# Patient Record
Sex: FEMALE | Race: WHITE | NOT HISPANIC OR LATINO | URBAN - METROPOLITAN AREA
[De-identification: names, ages, dates, MRNs, and addresses within clinical notes are randomized per-mention and may not be internally consistent; named-entity substitution may affect disease eponyms.]

---

## 2023-09-14 PROBLEM — R07.9 CHEST PAIN AT REST: Status: ACTIVE | Noted: 2019-12-02

## 2023-09-14 PROBLEM — H93.13 TINNITUS OF BOTH EARS: Status: ACTIVE | Noted: 2023-09-14

## 2023-09-14 PROBLEM — R32 INCONTINENCE OF URINE IN FEMALE: Status: ACTIVE | Noted: 2022-07-18

## 2023-09-14 PROBLEM — L03.113 CELLULITIS OF RIGHT ARM: Status: ACTIVE | Noted: 2020-06-04

## 2023-09-14 PROBLEM — F41.9 ANXIETY: Status: ACTIVE | Noted: 2022-12-28

## 2023-09-14 PROBLEM — R79.0 LOW MAGNESIUM LEVELS: Status: ACTIVE | Noted: 2022-07-18

## 2023-09-14 PROBLEM — R73.09 ELEVATED GLUCOSE LEVEL: Status: ACTIVE | Noted: 2022-07-28

## 2023-09-14 PROBLEM — R42 VERTIGO: Status: ACTIVE | Noted: 2023-09-14

## 2023-09-14 PROBLEM — M79.10 MYALGIA: Status: ACTIVE | Noted: 2022-05-31

## 2023-09-14 PROBLEM — I48.91 A-FIB (MULTI): Status: ACTIVE | Noted: 2021-05-06

## 2023-09-14 PROBLEM — H90.3 BILATERAL SENSORINEURAL HEARING LOSS: Status: ACTIVE | Noted: 2023-09-14

## 2023-09-14 PROBLEM — E78.5 HYPERLIPEMIA: Status: ACTIVE | Noted: 2021-05-06

## 2023-09-14 PROBLEM — E53.8 VITAMIN B 12 DEFICIENCY: Status: ACTIVE | Noted: 2022-07-18

## 2023-09-14 PROBLEM — K13.0 ANGULAR CHEILITIS: Status: ACTIVE | Noted: 2022-12-28

## 2023-09-14 PROBLEM — E55.9 VITAMIN D DEFICIENCY: Status: ACTIVE | Noted: 2022-07-18

## 2023-09-14 RX ORDER — DIPHENHYDRAMINE HCL 25 MG
1 CAPSULE ORAL NIGHTLY PRN
COMMUNITY
Start: 2020-06-26

## 2023-09-14 RX ORDER — ROSUVASTATIN CALCIUM 10 MG/1
1 TABLET, COATED ORAL NIGHTLY
COMMUNITY
Start: 2023-05-17 | End: 2024-02-21 | Stop reason: SDUPTHER

## 2023-09-14 RX ORDER — CLOTRIMAZOLE AND BETAMETHASONE DIPROPIONATE 10; .64 MG/G; MG/G
CREAM TOPICAL
COMMUNITY
Start: 2022-12-28 | End: 2023-12-01 | Stop reason: ALTCHOICE

## 2023-09-14 RX ORDER — NAPROXEN SODIUM 220 MG/1
1 TABLET ORAL DAILY
COMMUNITY
Start: 2020-05-11

## 2023-09-14 RX ORDER — ESCITALOPRAM OXALATE 10 MG/1
1 TABLET ORAL DAILY
COMMUNITY
Start: 2021-07-19 | End: 2023-12-01 | Stop reason: ALTCHOICE

## 2023-09-14 RX ORDER — VIT C/E/ZN/COPPR/LUTEIN/ZEAXAN 250MG-90MG
1 CAPSULE ORAL DAILY
COMMUNITY
Start: 2023-05-17

## 2023-09-14 RX ORDER — ESTRADIOL 0.1 MG/G
1 CREAM VAGINAL
COMMUNITY
Start: 2022-07-18 | End: 2023-12-01 | Stop reason: ALTCHOICE

## 2023-09-14 RX ORDER — CIPROFLOXACIN HYDROCHLORIDE 3 MG/ML
2 SOLUTION/ DROPS OPHTHALMIC EVERY 4 HOURS
COMMUNITY
End: 2023-12-01 | Stop reason: ALTCHOICE

## 2023-10-24 ENCOUNTER — CLINICAL SUPPORT (OUTPATIENT)
Dept: AUDIOLOGY | Facility: CLINIC | Age: 68
End: 2023-10-24
Payer: MEDICARE

## 2023-10-24 NOTE — PROGRESS NOTES
DEVICES: (2) [employer] P90-R RICs with medium open domes; medium receivers;  length: 1; color: P5    RIGHT SN: [id number]  LEFT SN: [id number]  TRIAL PERIOD: 5/26/2021  REPAIR/L&D WARRANTIES: 7/4/2024  FIT DATE: 4/26/2021    Reason For Visit: Hanh Mclean was seen today because her hearing aids stopped charging properly.     - [employer] charging update was saved onto hearing aid.  - Patient was given updated charging plug.  - Recommended that patient charge hearing aids using wall plug instead on extension chord.    If charging difficulties continue Ms. Mclean will contact [facility] and will have her hearing aids sent to [employer] for in warranty repair.

## 2023-12-01 ENCOUNTER — TELEMEDICINE (OUTPATIENT)
Dept: PRIMARY CARE | Facility: CLINIC | Age: 68
End: 2023-12-01
Payer: MEDICARE

## 2023-12-01 DIAGNOSIS — K59.09 CHRONIC CONSTIPATION: ICD-10-CM

## 2023-12-01 DIAGNOSIS — F41.9 ANXIETY: Primary | ICD-10-CM

## 2023-12-01 PROCEDURE — 99214 OFFICE O/P EST MOD 30 MIN: CPT | Performed by: FAMILY MEDICINE

## 2023-12-01 RX ORDER — HYDROXYZINE HYDROCHLORIDE 10 MG/1
20 TABLET, FILM COATED ORAL EVERY 24 HOURS
Qty: 180 TABLET | Refills: 1 | Status: SHIPPED | OUTPATIENT
Start: 2023-12-01 | End: 2024-02-21 | Stop reason: SDUPTHER

## 2023-12-01 RX ORDER — SENNOSIDES 8.6 MG/1
1 TABLET ORAL DAILY
COMMUNITY
End: 2024-06-04 | Stop reason: ALTCHOICE

## 2023-12-01 RX ORDER — SERTRALINE HYDROCHLORIDE 50 MG/1
50 TABLET, FILM COATED ORAL EVERY 24 HOURS
COMMUNITY
Start: 2023-08-30 | End: 2023-12-01 | Stop reason: SDUPTHER

## 2023-12-01 RX ORDER — SERTRALINE HYDROCHLORIDE 50 MG/1
50 TABLET, FILM COATED ORAL EVERY 24 HOURS
Qty: 90 TABLET | Refills: 1 | Status: SHIPPED | OUTPATIENT
Start: 2023-12-01 | End: 2024-02-21 | Stop reason: SDUPTHER

## 2023-12-01 RX ORDER — HYDROXYZINE HYDROCHLORIDE 10 MG/1
10 TABLET, FILM COATED ORAL EVERY 24 HOURS
COMMUNITY
Start: 2023-09-27 | End: 2023-12-01 | Stop reason: SDUPTHER

## 2023-12-01 ASSESSMENT — ENCOUNTER SYMPTOMS
LOSS OF SENSATION IN FEET: 0
DEPRESSION: 0
OCCASIONAL FEELINGS OF UNSTEADINESS: 0

## 2023-12-01 ASSESSMENT — LIFESTYLE VARIABLES
HOW OFTEN DO YOU HAVE A DRINK CONTAINING ALCOHOL: 4 OR MORE TIMES A WEEK
AUDIT-C TOTAL SCORE: 5
HOW OFTEN DO YOU HAVE SIX OR MORE DRINKS ON ONE OCCASION: LESS THAN MONTHLY
HOW MANY STANDARD DRINKS CONTAINING ALCOHOL DO YOU HAVE ON A TYPICAL DAY: 1 OR 2
SKIP TO QUESTIONS 9-10: 0

## 2023-12-01 ASSESSMENT — PATIENT HEALTH QUESTIONNAIRE - PHQ9
1. LITTLE INTEREST OR PLEASURE IN DOING THINGS: NOT AT ALL
SUM OF ALL RESPONSES TO PHQ9 QUESTIONS 1 AND 2: 0
2. FEELING DOWN, DEPRESSED OR HOPELESS: NOT AT ALL

## 2023-12-01 ASSESSMENT — PAIN SCALES - GENERAL: PAINLEVEL: 0-NO PAIN

## 2023-12-01 NOTE — PATIENT INSTRUCTIONS
Problem List Items Addressed This Visit             ICD-10-CM    Anxiety - Primary F41.9       Additional Visit Plans:  Referral for counseling at [facility] phone [phone]     Look into a Squatty Potty. Add metamucil capsules daily. Fiber with diet and supplement with goal of 25g daily.     Can look into MagOx 400mg nightly as needed.     Continue 50mg Zoloft, can increase in future if we need this to help our bowels more as a secondary measure. Continue hydroxyzine.     Follow up in 6 months or sooner if needed.     Next Wellness Exam/Annual Physical Due  At your earliest convenience    Patient Care Team:  Olaf English DO as PCP - General (Family Medicine)    Olaf English DO   12/01/23   12:52 PM

## 2023-12-01 NOTE — PROGRESS NOTES
Outpatient Visit Note    Chief Complaint   Patient presents with    Med Refill       With patient's permission, this is a Telemedicine visit with video and audio. The provider and patient participated in this telemedicine encounter.    HPI:  Hanh Mclean is a 68 y.o. female here for follow-up on her medication.    In September we discussed her anxiety for which transition to Zoloft was producing better coverage.  Lexapro was disrupting her sleep.  Her mood was still getting a little low at 5 PM they will and she was still having trouble with her sleep, laying in bed for several hours.  Plan was to trial hydroxyzine as needed for her difficulty with her sleep    Previously on Celexa after being on that for many years and was no longer covering her.  Her son's ADHD with insomnia and she herself was wondering if there was any component of this for her.     Today she states that she would like a referral to counseling.     She remains on 50mg Zoloft once daily, and says this is working well. The hydroxyzine is good too - she takes 2 at night and this really helps.    She added senna on her own for her constipation. This is a chronic struggle. She does this once every 10 days. She does water, pears, exercise.       Past Medical History:   Diagnosis Date    Migraine, unspecified, not intractable, without status migrainosus     Migraines    Personal history of other mental and behavioral disorders     History of anxiety        Current Medications  Current Outpatient Medications   Medication Instructions    cholecalciferol (Vitamin D-3) 25 MCG (1000 UT) capsule 1 capsule, oral, Daily    diphenhydrAMINE (BenadryL) 25 mg capsule 1 capsule, oral, Nightly PRN    hydrOXYzine HCL (ATARAX) 10 mg, oral, Every 24 hours    MAGNESIUM GLUCONATE ORAL 1 tablet, oral, Daily, ; magnesium gluconate 250 mg oral tablet<BR>    NON FORMULARY 1 tablet, Daily, methofolate 1000<BR>    omega 3-dha-epa-fish oil (Fish OiL) 1,200 (144-216)  mg capsule 1 capsule, oral, Daily    rivaroxaban (Xarelto) 20 mg tablet 1 tablet, oral, Daily, With food     rosuvastatin (Crestor) 10 mg tablet 1 tablet, oral, Nightly    sennosides (senna) 8.6 mg tablet 1 tablet, oral, Daily    sertraline (ZOLOFT) 50 mg, oral, Every 24 hours    vitamin B complex (B-COMPLEX ORAL) oral, Daily, ;B-Complex SR<BR>    vits A,C,E/lutein/minerals (OCUVITE WITH LUTEIN ORAL) oral, Daily        Allergies  No Known Allergies     Past Surgical History:   Procedure Laterality Date    OTHER SURGICAL HISTORY  2021    Knee surgery    OTHER SURGICAL HISTORY  2021    Lumpectomy    OTHER SURGICAL HISTORY  2021     section     Family History   Problem Relation Name Age of Onset    Obesity Sister          Have hearing problems    Obesity Sister          Have hearing problems    No Known Problems Daughter      No Known Problems Son       Social History     Tobacco Use    Smoking status: Former     Types: Cigarettes    Smokeless tobacco: Never   Vaping Use    Vaping Use: Never used   Substance Use Topics    Alcohol use: Yes     Alcohol/week: 10.0 standard drinks of alcohol     Types: 10 Glasses of wine per week    Drug use: Never     Tobacco Use: Medium Risk (2023)    Patient History     Smoking Tobacco Use: Former     Smokeless Tobacco Use: Never     Passive Exposure: Not on file        ROS  All pertinent positive symptoms are included in the history of present illness.  All other systems have been reviewed and are negative and noncontributory to this patient's current ailments.    VITAL SIGNS  There were no vitals filed for this visit.  There were no vitals filed for this visit.   There is no height or weight on file to calculate BMI.   Patient is unable to proivide    PHYSICAL EXAM  GENERAL APPEARANCE:  Alert and oriented x 3, Pleasant and cooperative, No acute distress.   LUNGS:  No conversational dyspnea or cough during encounter.   PSYCH:  appropriate mood and affect,  no difficulty with speech.   Telemedicine visit, no other exam component done.      Assessment/Plan   Problem List Items Addressed This Visit             ICD-10-CM    Anxiety - Primary F41.9       Additional Visit Plans:  Referral for counseling at [facility] phone [phone]     Look into a Squatty Potty. Add metamucil capsules daily. Fiber with diet and supplement with goal of 25g daily.     Can look into MagOx 400mg nightly as needed.     Continue 50mg Zoloft, can increase in future if we need this to help our bowels more as a secondary measure. Continue hydroxyzine.     Follow up in 6 months or sooner if needed.     Next Wellness Exam/Annual Physical Due  At your earliest convenience    Patient Care Team:  Olaf English DO as PCP - General (Family Medicine)    Olaf English DO   12/01/23   12:52 PM

## 2024-01-26 ENCOUNTER — TELEMEDICINE (OUTPATIENT)
Dept: PRIMARY CARE | Facility: CLINIC | Age: 69
End: 2024-01-26
Payer: MEDICARE

## 2024-01-26 DIAGNOSIS — R42 DIZZINESS: ICD-10-CM

## 2024-01-26 DIAGNOSIS — G44.1 VASCULAR HEADACHE, NOT ELSEWHERE CLASSIFIED: ICD-10-CM

## 2024-01-26 DIAGNOSIS — E78.2 MIXED HYPERLIPIDEMIA: Primary | ICD-10-CM

## 2024-01-26 DIAGNOSIS — E55.9 VITAMIN D DEFICIENCY: ICD-10-CM

## 2024-01-26 PROCEDURE — 99214 OFFICE O/P EST MOD 30 MIN: CPT | Performed by: FAMILY MEDICINE

## 2024-01-26 RX ORDER — LANOLIN ALCOHOL/MO/W.PET/CERES
400 CREAM (GRAM) TOPICAL DAILY
COMMUNITY

## 2024-01-26 RX ORDER — AMITRIPTYLINE HYDROCHLORIDE 25 MG/1
25 TABLET, FILM COATED ORAL NIGHTLY
Qty: 30 TABLET | Refills: 1 | Status: SHIPPED | OUTPATIENT
Start: 2024-01-26 | End: 2024-02-21 | Stop reason: SDUPTHER

## 2024-01-26 ASSESSMENT — ENCOUNTER SYMPTOMS
DEPRESSION: 0
LOSS OF SENSATION IN FEET: 0
OCCASIONAL FEELINGS OF UNSTEADINESS: 0

## 2024-01-26 ASSESSMENT — LIFESTYLE VARIABLES
HOW OFTEN DO YOU HAVE SIX OR MORE DRINKS ON ONE OCCASION: NEVER
AUDIT-C TOTAL SCORE: 4
HOW MANY STANDARD DRINKS CONTAINING ALCOHOL DO YOU HAVE ON A TYPICAL DAY: 1 OR 2
HOW OFTEN DO YOU HAVE A DRINK CONTAINING ALCOHOL: 4 OR MORE TIMES A WEEK
SKIP TO QUESTIONS 9-10: 1

## 2024-01-26 ASSESSMENT — PATIENT HEALTH QUESTIONNAIRE - PHQ9
SUM OF ALL RESPONSES TO PHQ9 QUESTIONS 1 AND 2: 0
2. FEELING DOWN, DEPRESSED OR HOPELESS: NOT AT ALL
1. LITTLE INTEREST OR PLEASURE IN DOING THINGS: NOT AT ALL

## 2024-01-26 NOTE — PATIENT INSTRUCTIONS
Problem List Items Addressed This Visit             ICD-10-CM    Hyperlipemia - Primary E78.5    Relevant Orders    CBC    Comprehensive Metabolic Panel    Lipid Panel    Tsh With Reflex To Free T4 If Abnormal    Vitamin D deficiency E55.9    Relevant Orders    Vitamin D 25-Hydroxy,Total (for eval of Vitamin D levels)     Other Visit Diagnoses         Codes    Dizziness     R42    Relevant Medications    amitriptyline (Elavil) 25 mg tablet    Other Relevant Orders    CBC    Comprehensive Metabolic Panel    Lipid Panel    Tsh With Reflex To Free T4 If Abnormal    Vitamin D 25-Hydroxy,Total (for eval of Vitamin D levels)    Referral to Physical Therapy    Vascular headache, not elsewhere classified     G44.1    Relevant Medications    amitriptyline (Elavil) 25 mg tablet    Other Relevant Orders    Referral to Physical Therapy            Additional Visit Plans:  Plan to trial amitriptyline to see if this helps prevent the spells from happening. Also for you to try vestibular rehab and see if this makes a difference.  Sounds like workup from a few years ago was reassuring but there may actually still be an element of vertigo so I would like you to try the physical therapy to see if that makes you feel better.  We will also treat this from a migraine/nerve/vascular standpoint with trying the amitriptyline.    Follow-up in 3 to 4 weeks.    Plan for labs to check for other sources.       Patient Care Team:  Olaf English DO as PCP - General (Family Medicine)    Olaf English DO   01/26/24   1:23 PM

## 2024-01-26 NOTE — PROGRESS NOTES
"Pt is unable to obtain vitals at this moment.    Sx include: dizziness, facial pressure, on/off plugged ears, feels vascular movement on skull leading to \"apex\".    Pt has taken an decongestant (pseudoephedrine) - which is no longer relieving pt sx.           Outpatient Visit Note    Chief Complaint   Patient presents with    Dizziness     Concerns with dizziness since beginning of January 2024       With patient's permission, this is a Telemedicine visit with video and audio. The provider and patient participated in this telemedicine encounter.    HPI:  Hanh Mclean is a 68 y.o. female here for dizziness with facial pressure and ear fullness.    She is not allergic to any medications.  Has not been on any antibiotics recently.    Symptoms started at the beginning of this month. Most noticeable when she gets out of bed. She feels dizzy and has to take it slow. She feels that at the base of the back of her head a drilling headache. Taking Sudafed took this away.     Has had spells  in the past and was put on a pain pill for this that did not help. She saw neurology and reportedly had an MRI done with no findings. Saw ENT a few years ago and was told she does not have vertigo.     She feels pressure along the sinuses on the front of her head and along the top of her skull.     She denies new neurological focal deficits. No vision changes. No trouble with speech. Has never tried vestibular rehab.     Has been taking Sudafed which was helping but is no longer helping now.    No pain or swelling or palpable lump over the temporal region or the mastoid bone behind her ears.    PHQ9/GAD7:         Past Medical History:   Diagnosis Date    Migraine, unspecified, not intractable, without status migrainosus     Migraines    Personal history of other mental and behavioral disorders     History of anxiety        Current Medications  Current Outpatient Medications   Medication Instructions    amitriptyline (ELAVIL) 25 mg, " oral, Nightly    cholecalciferol (Vitamin D-3) 25 MCG (1000 UT) capsule 1 capsule, oral, Daily    diphenhydrAMINE (BenadryL) 25 mg capsule 1 capsule, oral, Nightly PRN    hydrOXYzine HCL (ATARAX) 20 mg, oral, Every 24 hours    MAGNESIUM GLUCONATE ORAL 1 tablet, oral, Daily, ; magnesium gluconate 250 mg oral tablet<BR>    magnesium oxide (MAGOX) 400 mg, oral, Daily    NON FORMULARY 1 tablet, Daily, methofolate 1000<BR>    omega 3-dha-epa-fish oil (Fish OiL) 1,200 (144-216) mg capsule 1 capsule, oral, Daily    rivaroxaban (Xarelto) 20 mg tablet 1 tablet, oral, Daily, With food     rosuvastatin (Crestor) 10 mg tablet 1 tablet, oral, Nightly    sennosides (senna) 8.6 mg tablet 1 tablet, oral, Daily    sertraline (ZOLOFT) 50 mg, oral, Every 24 hours    vitamin B complex (B-COMPLEX ORAL) oral, Daily, ;B-Complex SR<BR>    vits A,C,E/lutein/minerals (OCUVITE WITH LUTEIN ORAL) oral, Daily        Allergies  No Known Allergies     Past Surgical History:   Procedure Laterality Date    OTHER SURGICAL HISTORY  2021    Knee surgery    OTHER SURGICAL HISTORY  2021    Lumpectomy    OTHER SURGICAL HISTORY  2021     section     Family History   Problem Relation Name Age of Onset    Obesity Sister          Have hearing problems    Obesity Sister          Have hearing problems    No Known Problems Daughter      No Known Problems Son       Social History     Tobacco Use    Smoking status: Former     Types: Cigarettes    Smokeless tobacco: Never   Vaping Use    Vaping Use: Never used   Substance Use Topics    Alcohol use: Yes     Alcohol/week: 10.0 standard drinks of alcohol     Types: 10 Glasses of wine per week    Drug use: Never     Tobacco Use: Medium Risk (2024)    Patient History     Smoking Tobacco Use: Former     Smokeless Tobacco Use: Never     Passive Exposure: Not on file        ROS  All pertinent positive symptoms are included in the history of present illness.  All other systems have been  reviewed and are negative and noncontributory to this patient's current ailments.    VITAL SIGNS  There were no vitals filed for this visit.  There were no vitals filed for this visit.   There is no height or weight on file to calculate BMI.   Patient is unable to proivide    PHYSICAL EXAM  GENERAL APPEARANCE:  Alert and oriented x 3, Pleasant and cooperative, No acute distress.   LUNGS:  No conversational dyspnea or cough during encounter.   PSYCH:  appropriate mood and affect, no difficulty with speech.   Telemedicine visit, no other exam component done.    Counseling:       Medication education:           Education:  The patient is counseled regarding potential side-effects of all new medications          Understanding:  Patient expressed understanding of information conveyed today          Adherence:  No barriers to adherence identified    Assessment/Plan   Problem List Items Addressed This Visit             ICD-10-CM    Hyperlipemia - Primary E78.5    Relevant Orders    CBC    Comprehensive Metabolic Panel    Lipid Panel    Tsh With Reflex To Free T4 If Abnormal    Vitamin D deficiency E55.9    Relevant Orders    Vitamin D 25-Hydroxy,Total (for eval of Vitamin D levels)     Other Visit Diagnoses         Codes    Dizziness     R42    Relevant Medications    amitriptyline (Elavil) 25 mg tablet    Other Relevant Orders    CBC    Comprehensive Metabolic Panel    Lipid Panel    Tsh With Reflex To Free T4 If Abnormal    Vitamin D 25-Hydroxy,Total (for eval of Vitamin D levels)    Referral to Physical Therapy    Vascular headache, not elsewhere classified     G44.1    Relevant Medications    amitriptyline (Elavil) 25 mg tablet    Other Relevant Orders    Referral to Physical Therapy            Additional Visit Plans:  Plan to trial amitriptyline to see if this helps prevent the spells from happening. Also for you to try vestibular rehab and see if this makes a difference.  Sounds like workup from a few years ago was  reassuring but there may actually still be an element of vertigo so I would like you to try the physical therapy to see if that makes you feel better.  We will also treat this from a migraine/nerve/vascular standpoint with trying the amitriptyline.    Follow-up in 3 to 4 weeks.    Plan for labs to check for other sources.       Patient Care Team:  Olaf English DO as PCP - General (Family Medicine)    Olaf English DO   01/26/24   1:23 PM

## 2024-02-14 ENCOUNTER — LAB (OUTPATIENT)
Dept: LAB | Facility: LAB | Age: 69
End: 2024-02-14
Payer: MEDICARE

## 2024-02-14 DIAGNOSIS — R42 DIZZINESS: ICD-10-CM

## 2024-02-14 DIAGNOSIS — E78.2 MIXED HYPERLIPIDEMIA: ICD-10-CM

## 2024-02-14 DIAGNOSIS — E55.9 VITAMIN D DEFICIENCY: ICD-10-CM

## 2024-02-14 LAB
25(OH)D3 SERPL-MCNC: 55 NG/ML (ref 31–100)
ALBUMIN SERPL-MCNC: 4.5 G/DL (ref 3.5–5)
ALP BLD-CCNC: 65 U/L (ref 35–125)
ALT SERPL-CCNC: 46 U/L (ref 5–40)
ANION GAP SERPL CALC-SCNC: 10 MMOL/L
AST SERPL-CCNC: 34 U/L (ref 5–40)
BILIRUB SERPL-MCNC: 0.4 MG/DL (ref 0.1–1.2)
BUN SERPL-MCNC: 16 MG/DL (ref 8–25)
CALCIUM SERPL-MCNC: 9.6 MG/DL (ref 8.5–10.4)
CHLORIDE SERPL-SCNC: 106 MMOL/L (ref 97–107)
CHOLEST SERPL-MCNC: 183 MG/DL (ref 133–200)
CHOLEST/HDLC SERPL: 2 {RATIO}
CO2 SERPL-SCNC: 27 MMOL/L (ref 24–31)
CREAT SERPL-MCNC: 0.9 MG/DL (ref 0.4–1.6)
EGFRCR SERPLBLD CKD-EPI 2021: 69 ML/MIN/1.73M*2
ERYTHROCYTE [DISTWIDTH] IN BLOOD BY AUTOMATED COUNT: 13.5 % (ref 11.5–14.5)
GLUCOSE SERPL-MCNC: 99 MG/DL (ref 65–99)
HCT VFR BLD AUTO: 44.7 % (ref 36–46)
HDLC SERPL-MCNC: 90 MG/DL
HGB BLD-MCNC: 14.4 G/DL (ref 12–16)
LDLC SERPL CALC-MCNC: 78 MG/DL (ref 65–130)
MCH RBC QN AUTO: 31.2 PG (ref 26–34)
MCHC RBC AUTO-ENTMCNC: 32.2 G/DL (ref 32–36)
MCV RBC AUTO: 97 FL (ref 80–100)
NRBC BLD-RTO: 0 /100 WBCS (ref 0–0)
PLATELET # BLD AUTO: 197 X10*3/UL (ref 150–450)
POTASSIUM SERPL-SCNC: 5.4 MMOL/L (ref 3.4–5.1)
PROT SERPL-MCNC: 6.8 G/DL (ref 5.9–7.9)
RBC # BLD AUTO: 4.62 X10*6/UL (ref 4–5.2)
SODIUM SERPL-SCNC: 143 MMOL/L (ref 133–145)
TRIGL SERPL-MCNC: 74 MG/DL (ref 40–150)
TSH SERPL DL<=0.05 MIU/L-ACNC: 1.73 MIU/L (ref 0.27–4.2)
WBC # BLD AUTO: 6.3 X10*3/UL (ref 4.4–11.3)

## 2024-02-14 PROCEDURE — 80061 LIPID PANEL: CPT

## 2024-02-14 PROCEDURE — 82306 VITAMIN D 25 HYDROXY: CPT

## 2024-02-14 PROCEDURE — 80053 COMPREHEN METABOLIC PANEL: CPT

## 2024-02-14 PROCEDURE — 85027 COMPLETE CBC AUTOMATED: CPT

## 2024-02-14 PROCEDURE — 84443 ASSAY THYROID STIM HORMONE: CPT

## 2024-02-14 PROCEDURE — 36415 COLL VENOUS BLD VENIPUNCTURE: CPT

## 2024-02-21 ENCOUNTER — OFFICE VISIT (OUTPATIENT)
Dept: PRIMARY CARE | Facility: CLINIC | Age: 69
End: 2024-02-21
Payer: MEDICARE

## 2024-02-21 VITALS
WEIGHT: 127.4 LBS | DIASTOLIC BLOOD PRESSURE: 70 MMHG | SYSTOLIC BLOOD PRESSURE: 110 MMHG | BODY MASS INDEX: 21.23 KG/M2 | TEMPERATURE: 97.6 F | HEART RATE: 75 BPM | OXYGEN SATURATION: 99 % | HEIGHT: 65 IN

## 2024-02-21 DIAGNOSIS — R42 DIZZINESS: ICD-10-CM

## 2024-02-21 DIAGNOSIS — E78.2 MIXED HYPERLIPIDEMIA: ICD-10-CM

## 2024-02-21 DIAGNOSIS — Z12.31 ENCOUNTER FOR SCREENING MAMMOGRAM FOR MALIGNANT NEOPLASM OF BREAST: Primary | ICD-10-CM

## 2024-02-21 DIAGNOSIS — G44.1 VASCULAR HEADACHE, NOT ELSEWHERE CLASSIFIED: ICD-10-CM

## 2024-02-21 DIAGNOSIS — R74.01 ELEVATED ALT MEASUREMENT: ICD-10-CM

## 2024-02-21 DIAGNOSIS — E87.5 HYPERKALEMIA: ICD-10-CM

## 2024-02-21 DIAGNOSIS — F41.9 ANXIETY: ICD-10-CM

## 2024-02-21 PROCEDURE — 99214 OFFICE O/P EST MOD 30 MIN: CPT | Performed by: FAMILY MEDICINE

## 2024-02-21 PROCEDURE — 1159F MED LIST DOCD IN RCRD: CPT | Performed by: FAMILY MEDICINE

## 2024-02-21 PROCEDURE — 1036F TOBACCO NON-USER: CPT | Performed by: FAMILY MEDICINE

## 2024-02-21 PROCEDURE — 1160F RVW MEDS BY RX/DR IN RCRD: CPT | Performed by: FAMILY MEDICINE

## 2024-02-21 PROCEDURE — 1126F AMNT PAIN NOTED NONE PRSNT: CPT | Performed by: FAMILY MEDICINE

## 2024-02-21 RX ORDER — AMITRIPTYLINE HYDROCHLORIDE 25 MG/1
25 TABLET, FILM COATED ORAL NIGHTLY
Qty: 90 TABLET | Refills: 1 | Status: SHIPPED | OUTPATIENT
Start: 2024-02-21 | End: 2024-08-19

## 2024-02-21 RX ORDER — ROSUVASTATIN CALCIUM 10 MG/1
10 TABLET, COATED ORAL NIGHTLY
Qty: 90 TABLET | Refills: 1 | Status: SHIPPED | OUTPATIENT
Start: 2024-02-21

## 2024-02-21 RX ORDER — SERTRALINE HYDROCHLORIDE 50 MG/1
50 TABLET, FILM COATED ORAL EVERY 24 HOURS
Qty: 90 TABLET | Refills: 1 | Status: SHIPPED | OUTPATIENT
Start: 2024-02-21 | End: 2024-08-19

## 2024-02-21 RX ORDER — HYDROXYZINE HYDROCHLORIDE 10 MG/1
20 TABLET, FILM COATED ORAL EVERY 24 HOURS
Qty: 180 TABLET | Refills: 1 | Status: SHIPPED | OUTPATIENT
Start: 2024-02-21 | End: 2024-08-19

## 2024-02-21 ASSESSMENT — LIFESTYLE VARIABLES
HOW OFTEN DO YOU HAVE A DRINK CONTAINING ALCOHOL: 4 OR MORE TIMES A WEEK
HOW OFTEN DO YOU HAVE SIX OR MORE DRINKS ON ONE OCCASION: NEVER
HOW MANY STANDARD DRINKS CONTAINING ALCOHOL DO YOU HAVE ON A TYPICAL DAY: 1 OR 2
AUDIT-C TOTAL SCORE: 4
SKIP TO QUESTIONS 9-10: 1

## 2024-02-21 ASSESSMENT — PATIENT HEALTH QUESTIONNAIRE - PHQ9
2. FEELING DOWN, DEPRESSED OR HOPELESS: NOT AT ALL
SUM OF ALL RESPONSES TO PHQ9 QUESTIONS 1 AND 2: 0
1. LITTLE INTEREST OR PLEASURE IN DOING THINGS: NOT AT ALL

## 2024-02-21 ASSESSMENT — PAIN SCALES - GENERAL: PAINLEVEL: 0-NO PAIN

## 2024-02-21 NOTE — PATIENT INSTRUCTIONS
Problem List Items Addressed This Visit             ICD-10-CM    Anxiety F41.9    Hyperlipemia E78.5     Other Visit Diagnoses         Codes    Encounter for screening mammogram for malignant neoplasm of breast    -  Primary Z12.31    Relevant Orders    BI mammo bilateral screening tomosynthesis    Hyperkalemia     E87.5    Relevant Orders    Basic metabolic panel    Comprehensive metabolic panel    Elevated ALT measurement     R74.01    Relevant Orders    Comprehensive metabolic panel    Dizziness     R42    Vascular headache, not elsewhere classified     G44.1            Additional Visit Plans:  Blood pressure and heart rate are good today.     Plan to dial back on the potassium rich fruits, maybe do half now. Will recheck potassium level in 2-3 weeks. Recheck liver number in 3 months - I think this is just a little blip. Focus on low fat diet in general.     Refills provided. Plan to try the physical therapy and see if that  helps the balance.       Patient Care Team:  Olaf English DO as PCP - General (Family Medicine)    Olaf English DO   02/21/24   11:12 AM

## 2024-02-21 NOTE — PROGRESS NOTES
"         Outpatient Visit Note    Chief Complaint   Patient presents with    Follow-up     1month followup headache/dizziness; discuss lab results       HPI:  Hanh Mclean is a 68 y.o. female here for dizziness follow-up.    She saw me last month for continued dizziness with facial pressure.  Has seen neurology with no findings on MRI.  Has seen ENT and was told she does not have vertigo but had never tried vestibular rehab.  Sudafed was no longer helping.  Plan was to have her try amitriptyline to see if this prevents dizzy spells from happening and for her to try vestibular rehab to see if this would make a difference.  I felt there may be an element of vertigo still present.  She had blood work done which was reviewed earlier today, discussed with patient at her visit.    \"Labs reviewed: Potassium is a little bit elevated.  Has she been eating a lot of bananas or oranges lately?  Dial back on these things.  Sometimes potassium can be high due to mishandling of her blood sample.  The rest of her electrolytes look good.  Kidneys look fine.  Slight elevation in her ALT liver number, this can be from using a lot of Tylenol or herbal supplements or green tea.  Back on these things.  Vitamin D level looks good.  Continue with current dosing of her vitamin D supplement.  Thyroid level looks good.  Cholesterol is good.  CBC with no anemia.  I do not see anything to be contributing towards her dizziness.  Will wait to hear back regarding her answers for the potassium for us to determine if we want a repeat a test to recheck this.\"    Today she states that she does a lot of fruit and makes a fruit salad. No green tea, tylenol use or other supplements. Some alcohol but in moderation.     Her headaches are much better on the amitriptyline and her sleep is much better. Still having some dizziness/wobble with her balance but is better. Has PT appt starting in March. No head pain, sometimes feels something fullness/pressure " in various parts of her head.     She likes the combination of her medicines.     PHQ9/GAD7:         Past Medical History:   Diagnosis Date    Migraine, unspecified, not intractable, without status migrainosus     Migraines    Personal history of other mental and behavioral disorders     History of anxiety        Current Medications  Current Outpatient Medications   Medication Instructions    amitriptyline (ELAVIL) 25 mg, oral, Nightly    cholecalciferol (Vitamin D-3) 25 MCG (1000 UT) capsule 1 capsule, oral, Daily    diphenhydrAMINE (BenadryL) 25 mg capsule 1 capsule, oral, Nightly PRN    hydrOXYzine HCL (ATARAX) 20 mg, oral, Every 24 hours    MAGNESIUM GLUCONATE ORAL 1 tablet, oral, Daily, ; magnesium gluconate 250 mg oral tablet<BR>    magnesium oxide (MAGOX) 400 mg, oral, Daily    NON FORMULARY 1 tablet, Daily, methofolate 1000<BR>    omega 3-dha-epa-fish oil (Fish OiL) 1,200 (144-216) mg capsule 1 capsule, oral, Daily    rivaroxaban (Xarelto) 20 mg tablet 1 tablet, oral, Daily, With food     rosuvastatin (CRESTOR) 10 mg, oral, Nightly    sennosides (senna) 8.6 mg tablet 1 tablet, oral, Daily    sertraline (ZOLOFT) 50 mg, oral, Every 24 hours    vitamin B complex (B-COMPLEX ORAL) oral, Daily, ;B-Complex SR<BR>    vits A,C,E/lutein/minerals (OCUVITE WITH LUTEIN ORAL) oral, Daily        Allergies  No Known Allergies     Past Surgical History:   Procedure Laterality Date    OTHER SURGICAL HISTORY  2021    Knee surgery    OTHER SURGICAL HISTORY  2021    Lumpectomy    OTHER SURGICAL HISTORY  2021     section     Family History   Problem Relation Name Age of Onset    Obesity Sister          Have hearing problems    Obesity Sister          Have hearing problems    No Known Problems Daughter      No Known Problems Son       Social History     Tobacco Use    Smoking status: Former     Types: Cigarettes    Smokeless tobacco: Never   Vaping Use    Vaping Use: Never used   Substance Use Topics     Alcohol use: Yes     Alcohol/week: 10.0 standard drinks of alcohol     Types: 10 Glasses of wine per week    Drug use: Never     Tobacco Use: Medium Risk (2/21/2024)    Patient History     Smoking Tobacco Use: Former     Smokeless Tobacco Use: Never     Passive Exposure: Not on file        ROS  All pertinent positive symptoms are included in the history of present illness.  All other systems have been reviewed and are negative and noncontributory to this patient's current ailments.    VITAL SIGNS  Vitals:    02/21/24 1046   BP: 110/70   Pulse: 75   Temp: 36.4 °C (97.6 °F)   SpO2: 99%     Vitals:    02/21/24 1046   Weight: 57.8 kg (127 lb 6.4 oz)      Body mass index is 21.2 kg/m².     PHYSICAL EXAM  GENERAL APPEARANCE: well nourished, well developed, looks like stated age, in no acute distress, not ill or tired appearing, conversing well.   HEENT: no trauma, normocephalic.   NECK: supple without rigidity, no neck mass was observed.   LUNGS: good chest wall expansion. In no acute respiratory distress.   EXTREMITIES: moving all extremities equally with no edema.   SKIN: normal skin color and pigmentation, without rash.   NEUROLOGIC EXAM: CN II-XII grossly intact, normal gait.   PSYCH: mood and affect appropriate; alert and oriented to time, place, person; no difficulty with speech or language.     Counseling:       Medication education:           Education:  The patient is counseled regarding potential side-effects of all new medications          Understanding:  Patient expressed understanding of information conveyed today          Adherence:  No barriers to adherence identified     Assessment/Plan   Problem List Items Addressed This Visit             ICD-10-CM    Anxiety F41.9    Relevant Medications    hydrOXYzine HCL (Atarax) 10 mg tablet    sertraline (Zoloft) 50 mg tablet    Hyperlipemia E78.5    Relevant Medications    rosuvastatin (Crestor) 10 mg tablet     Other Visit Diagnoses         Codes    Encounter for  screening mammogram for malignant neoplasm of breast    -  Primary Z12.31    Relevant Orders    BI mammo bilateral screening tomosynthesis    Hyperkalemia     E87.5    Relevant Orders    Basic metabolic panel    Comprehensive metabolic panel    Elevated ALT measurement     R74.01    Relevant Orders    Comprehensive metabolic panel    Dizziness     R42    Relevant Medications    amitriptyline (Elavil) 25 mg tablet    Vascular headache, not elsewhere classified     G44.1    Relevant Medications    amitriptyline (Elavil) 25 mg tablet            Additional Visit Plans:  Blood pressure and heart rate are good today.     Plan to dial back on the potassium rich fruits, maybe do half now. Will recheck potassium level in 2-3 weeks. Recheck liver number in 3 months - I think this is just a little blip. Focus on low fat diet in general.     Refills provided. Plan to try the physical therapy and see if that  helps the balance.       Patient Care Team:  Olaf English DO as PCP - General (Family Medicine)    Olaf English DO   02/27/24   7:54 AM

## 2024-03-25 ENCOUNTER — APPOINTMENT (OUTPATIENT)
Dept: PHYSICAL THERAPY | Facility: CLINIC | Age: 69
End: 2024-03-25
Payer: MEDICARE

## 2024-04-03 ENCOUNTER — APPOINTMENT (OUTPATIENT)
Dept: RADIOLOGY | Facility: HOSPITAL | Age: 69
End: 2024-04-03
Payer: MEDICARE

## 2024-04-25 ENCOUNTER — LAB (OUTPATIENT)
Dept: LAB | Facility: LAB | Age: 69
End: 2024-04-25
Payer: MEDICARE

## 2024-04-25 ENCOUNTER — CLINICAL SUPPORT (OUTPATIENT)
Dept: PHYSICAL THERAPY | Facility: CLINIC | Age: 69
End: 2024-04-25
Payer: MEDICARE

## 2024-04-25 DIAGNOSIS — R74.01 ELEVATED ALT MEASUREMENT: ICD-10-CM

## 2024-04-25 DIAGNOSIS — R42 DIZZINESS: ICD-10-CM

## 2024-04-25 DIAGNOSIS — E87.5 HYPERKALEMIA: ICD-10-CM

## 2024-04-25 DIAGNOSIS — G44.1 VASCULAR HEADACHE, NOT ELSEWHERE CLASSIFIED: ICD-10-CM

## 2024-04-25 LAB
ALBUMIN SERPL-MCNC: 4.7 G/DL (ref 3.5–5)
ALP BLD-CCNC: 63 U/L (ref 35–125)
ALT SERPL-CCNC: 29 U/L (ref 5–40)
ANION GAP SERPL CALC-SCNC: 15 MMOL/L
AST SERPL-CCNC: 28 U/L (ref 5–40)
BILIRUB SERPL-MCNC: 0.4 MG/DL (ref 0.1–1.2)
BUN SERPL-MCNC: 22 MG/DL (ref 8–25)
CALCIUM SERPL-MCNC: 9.8 MG/DL (ref 8.5–10.4)
CHLORIDE SERPL-SCNC: 101 MMOL/L (ref 97–107)
CO2 SERPL-SCNC: 25 MMOL/L (ref 24–31)
CREAT SERPL-MCNC: 0.9 MG/DL (ref 0.4–1.6)
EGFRCR SERPLBLD CKD-EPI 2021: 69 ML/MIN/1.73M*2
GLUCOSE SERPL-MCNC: 100 MG/DL (ref 65–99)
POTASSIUM SERPL-SCNC: 4.8 MMOL/L (ref 3.4–5.1)
PROT SERPL-MCNC: 7.2 G/DL (ref 5.9–7.9)
SODIUM SERPL-SCNC: 141 MMOL/L (ref 133–145)

## 2024-04-25 PROCEDURE — 80053 COMPREHEN METABOLIC PANEL: CPT

## 2024-04-25 PROCEDURE — 97161 PT EVAL LOW COMPLEX 20 MIN: CPT | Mod: GP | Performed by: PHYSICAL THERAPIST

## 2024-04-25 PROCEDURE — 36415 COLL VENOUS BLD VENIPUNCTURE: CPT

## 2024-04-25 ASSESSMENT — PAIN - FUNCTIONAL ASSESSMENT: PAIN_FUNCTIONAL_ASSESSMENT: 0-10

## 2024-04-25 ASSESSMENT — PAIN SCALES - GENERAL: PAINLEVEL_OUTOF10: 0 - NO PAIN

## 2024-04-25 NOTE — PROGRESS NOTES
" Vestibular Evaluation    Patient Name: Hanh Mclean  MRN: [id number]  Today's Date: 04/25/24  Low complexity due to patient's clinical presentation being stable and uncomplicated by any significant comorbidities that may affect rehab tolerance and progression.  Time Calculation  Start Time: 0927  Stop Time: 0947  Time Calculation (min): 20 min     Insurance:  Visit number: 1 of 1  Authorization info: NO AUTH / [place] VISITS   Insurance Type: Payor: MEDICARE / Plan: MEDICARE PART A AND B / Product Type: *No Product type* /     Current Problem:   1. Dizziness  Referral to Physical Therapy      2. Vascular headache, not elsewhere classified  Referral to Physical Therapy          Subjective   General Visit Information:  General  Reason for Referral: Dizziness  Referred By: Dr. English  General Comment: Pt reports MD wanted her to attend therapy to check any vestibular issues. She reports she is very active with yoga and hiking but doesn't let the symptoms stop her from activity. She does not report any sensation of spinning but there is an \"odd\" feeling in her brain \"feels the blood moving around or my brain moving.\" She does not report any pain along the head/scalp. She feels this problem started when she was on a rollar coaster as a child.  Precautions:  Precautions  Precautions Comment: None  Pain:  Pain Assessment  Pain Assessment: 0-10  Pain Score: 0 - No pain    Prior Level of Function:  Level of [place]: Independent with ADLs and functional transfers, Independent with homemaking with ambulation    Objective     Neuro Oculomotor:  Range of Motion: Normal  Smooth Pursuit: Normal  Horizontal Saccades: Normal  Vertical Saccades: Normal  Eye Alignment : Normal  Distraction Cover: Normal  Distraction Uncover: Normal  Convergence:  (Did note reduction in distance however this may be age related, no symptoms)  Neuro Vestibular:  Horizontal VOR: Negative  Vertical VOR: Negative  Spontaneous Nystagmus: Absent  Gaze " Evoked Nystagmus: Absent  Positional Testing:  Clifton-Halpike Right: negative  Alejandro-Halpike Left: negative  Horizontal Roll Test Right: negative  Horizontal Roll Test Left: negative  Sidelying Test Right: negative  Sidelying Test Left: negative      Outcome Measures:  Other Measures  Dizziness Handicap Inventory: 4  Dynamic Gait Index (DGI): 11/12 (Modified 4 point)       OP EDUCATION:  Outpatient Education  Individual(s) Educated: Patient  Education Provided: POC  Patient Response to Education: Patient/Caregiver Verbalized Understanding of Information, Patient/Caregiver Asked Appropriate Questions    Assessment   Assessment:   Assessment Comment: Pt presented to therapy with concerns for dizziness. Pt did not test positive on any findings during vestibular testing this date. She had difficulty maintaining stright path during ambulation with head turns however rest of testing was within normal limits. PT did discuss this may be due to some weakness with balance systems, discussed short series of PT for overall balance however pt reports independence with daily activities. Pt and PT discussed findings this date and at this time do not feel formal therapy is necessary as pt is able to self manage described symptoms and did not have any positive findings of vestibular involvement at this time, pt agreed to POC.      Plan:  PT Plan: No Additional PT interventions required at this time  Plan of Care Agreement: Patient    Goals:  Resolved       PT Problem       PT Goal 1 (Met)       Start:  04/25/24    Expected End:  04/25/24    Resolved:  04/25/24    Pt will verbalize understanding of POC.

## 2024-05-22 ENCOUNTER — APPOINTMENT (OUTPATIENT)
Dept: RADIOLOGY | Facility: HOSPITAL | Age: 69
End: 2024-05-22
Payer: MEDICARE

## 2024-05-29 ENCOUNTER — HOSPITAL ENCOUNTER (OUTPATIENT)
Dept: RADIOLOGY | Facility: HOSPITAL | Age: 69
Discharge: HOME | End: 2024-05-29
Payer: MEDICARE

## 2024-05-29 VITALS — BODY MASS INDEX: 20.83 KG/M2 | WEIGHT: 125 LBS | HEIGHT: 65 IN

## 2024-05-29 DIAGNOSIS — Z12.31 ENCOUNTER FOR SCREENING MAMMOGRAM FOR MALIGNANT NEOPLASM OF BREAST: ICD-10-CM

## 2024-05-29 PROCEDURE — 77063 BREAST TOMOSYNTHESIS BI: CPT | Performed by: STUDENT IN AN ORGANIZED HEALTH CARE EDUCATION/TRAINING PROGRAM

## 2024-05-29 PROCEDURE — 77067 SCR MAMMO BI INCL CAD: CPT | Performed by: STUDENT IN AN ORGANIZED HEALTH CARE EDUCATION/TRAINING PROGRAM

## 2024-05-29 PROCEDURE — 77067 SCR MAMMO BI INCL CAD: CPT

## 2024-06-04 ENCOUNTER — OFFICE VISIT (OUTPATIENT)
Dept: CARDIOLOGY | Facility: HOSPITAL | Age: 69
End: 2024-06-04
Payer: MEDICARE

## 2024-06-04 VITALS
SYSTOLIC BLOOD PRESSURE: 115 MMHG | WEIGHT: 128.53 LBS | HEART RATE: 72 BPM | DIASTOLIC BLOOD PRESSURE: 73 MMHG | BODY MASS INDEX: 21.39 KG/M2 | OXYGEN SATURATION: 99 %

## 2024-06-04 DIAGNOSIS — I35.0 NONRHEUMATIC AORTIC VALVE STENOSIS: ICD-10-CM

## 2024-06-04 DIAGNOSIS — I48.0 PAROXYSMAL ATRIAL FIBRILLATION (MULTI): Primary | ICD-10-CM

## 2024-06-04 DIAGNOSIS — I35.1 NONRHEUMATIC AORTIC VALVE INSUFFICIENCY: ICD-10-CM

## 2024-06-04 DIAGNOSIS — Q23.1 BICUSPID AORTIC VALVE (HHS-HCC): ICD-10-CM

## 2024-06-04 PROBLEM — Q23.81 BICUSPID AORTIC VALVE: Status: ACTIVE | Noted: 2024-06-04

## 2024-06-04 PROCEDURE — 93005 ELECTROCARDIOGRAM TRACING: CPT | Performed by: STUDENT IN AN ORGANIZED HEALTH CARE EDUCATION/TRAINING PROGRAM

## 2024-06-04 PROCEDURE — 99214 OFFICE O/P EST MOD 30 MIN: CPT | Performed by: STUDENT IN AN ORGANIZED HEALTH CARE EDUCATION/TRAINING PROGRAM

## 2024-06-04 NOTE — PROGRESS NOTES
Primary Care Physician: Olaf English DO   Date of Visit: 06/04/2024  1:20 PM EDT  Type of Visit: follow up       Chief Complaint:  No chief complaint on file.       HPI  Hanh Mclean 68 y.o. female with hx of BPPV follows with ENT, anxiety, migraine, and parox afib on xarelto who presents today for follow up     She remains active  Denies chest pain  Sometimes she gets sob with running, it starts in the beginning improves after she keeps going    No bleeding issues  Sometimes she feels palpitations, graham with eating          Review of Systems   Review of Systems   12 points review of systems are negative expect for the above    Social History:  Social History     Socioeconomic History    Marital status:      Spouse name: Not on file    Number of children: Not on file    Years of education: Not on file    Highest education level: Not on file   Occupational History    Not on file   Tobacco Use    Smoking status: Former     Types: Cigarettes    Smokeless tobacco: Never   Vaping Use    Vaping status: Never Used   Substance and Sexual Activity    Alcohol use: Yes     Alcohol/week: 10.0 standard drinks of alcohol     Types: 10 Glasses of wine per week    Drug use: Never    Sexual activity: Not on file   Other Topics Concern    Not on file   Social History Narrative    Not on file     Social Determinants of Health     Financial Resource Strain: Not on file   Food Insecurity: Not on file   Transportation Needs: Not on file   Physical Activity: Not on file   Stress: Not on file   Social Connections: Not on file   Intimate Partner Violence: Not on file   Housing Stability: Not on file        Past Medical History:  Past Medical History:   Diagnosis Date    Migraine, unspecified, not intractable, without status migrainosus     Migraines    Personal history of other mental and behavioral disorders     History of anxiety       Past Surgical History:  Past Surgical History:   Procedure Laterality Date    OTHER  "SURGICAL HISTORY  2021    Knee surgery    OTHER SURGICAL HISTORY  2021    Lumpectomy    OTHER SURGICAL HISTORY  2021     section       Family History:  Family History   Problem Relation Name Age of Onset    Obesity Sister          Have hearing problems    Obesity Sister          Have hearing problems    No Known Problems Daughter      No Known Problems Son          Objective:       2022     9:24 AM 2023     9:56 AM 2023    10:51 AM 2023    12:00 AM 2023    12:00 AM 2024    10:46 AM 2024     2:03 PM   Vitals   Systolic 100 110 149 108  110    Diastolic 62 70 81 70  70    Heart Rate   60 71  75    Temp    35.8 °C (96.4 °F)  36.4 °C (97.6 °F)    Resp   16       Height (in) 1.651 m (5' 5\") 1.651 m (5' 5\") 1.651 m (5' 5\") 1.651 m (5' 5\") 1.651 m (5' 5\") 1.651 m (5' 5\") 1.651 m (5' 5\")   Weight (lb) 125 125 124.8 124.2 120 127.4 125   BMI 20.8 kg/m2 20.8 kg/m2 20.77 kg/m2 20.67 kg/m2 19.97 kg/m2 21.2 kg/m2 20.8 kg/m2   BSA (m2) 1.61 m2 1.61 m2 1.61 m2 1.61 m2 1.58 m2 1.63 m2 1.61 m2   Visit Report      Report       Constitutional:       Appearance: Healthy appearance. Not in distress.   Neck:      Vascular: No JVR. JVD normal.   Pulmonary:      Effort: Pulmonary effort is normal.      Breath sounds: Normal breath sounds. No wheezing. No rhonchi. No rales.   Chest:      Chest wall: Not tender to palpatation.   Cardiovascular:      PMI at left midclavicular line. Normal rate. Regular rhythm. Normal S1. Normal S2.       Murmurs: There is +2/6 ESM.      No gallop.  No click. No rub.   Pulses:     Intact distal pulses.   Edema:     Peripheral edema absent.   Abdominal:      General: Bowel sounds are normal.      Palpations: Abdomen is soft.      Tenderness: There is no abdominal tenderness.   Musculoskeletal: Normal range of motion.         General: No tenderness.   Skin:     General: Skin is warm and dry.   Neurological:      General: No focal deficit present.      " Mental Status: Alert and oriented to person, place and time.     Allergies:  No Known Allergies    Medications:  Current Outpatient Medications   Medication Instructions    amitriptyline (ELAVIL) 25 mg, oral, Nightly    cholecalciferol (Vitamin D-3) 25 MCG (1000 UT) capsule 1 capsule, oral, Daily    diphenhydrAMINE (BenadryL) 25 mg capsule 1 capsule, oral, Nightly PRN    hydrOXYzine HCL (ATARAX) 20 mg, oral, Every 24 hours    MAGNESIUM GLUCONATE ORAL 1 tablet, oral, Daily, ; magnesium gluconate 250 mg oral tablet<BR>    magnesium oxide (MAGOX) 400 mg, oral, Daily    NON FORMULARY 1 tablet, Daily, methofolate 1000<BR>    omega 3-dha-epa-fish oil (Fish OiL) 1,200 (144-216) mg capsule 1 capsule, oral, Daily    rivaroxaban (Xarelto) 20 mg tablet 1 tablet, oral, Daily, With food     rosuvastatin (CRESTOR) 10 mg, oral, Nightly    sennosides (senna) 8.6 mg tablet 1 tablet, oral, Daily    sertraline (ZOLOFT) 50 mg, oral, Every 24 hours    vitamin B complex (B-COMPLEX ORAL) oral, Daily, ;B-Complex SR<BR>    vits A,C,E/lutein/minerals (OCUVITE WITH LUTEIN ORAL) oral, Daily        Labs and Imaging:     Lab Results   Component Value Date    WBC 6.3 02/14/2024    HGB 14.4 02/14/2024    HCT 44.7 02/14/2024     02/14/2024    CHOL 183 02/14/2024    TRIG 74 02/14/2024    HDL 90.0 02/14/2024    ALT 29 04/25/2024    AST 28 04/25/2024     04/25/2024    K 4.8 04/25/2024     04/25/2024    CREATININE 0.90 04/25/2024    BUN 22 04/25/2024    CO2 25 04/25/2024    TSH 1.73 02/14/2024    HGBA1C 5.6 05/25/2023         Echocardiogram:   Echocardiogram     [facility], [address], [place] [address]  Tel [phone] and Fax [phone]    TRANSTHORACIC ECHOCARDIOGRAM REPORT      Patient Name:     OLAF Romero Physician:   [id number] Jose Juan Rendon MD  Study Date:       6/12/2023     Referring Physician: JOSE JUAN RENDON  MRN/PID:          [id number]      PCP:  Accession/Order#: [id number]   Department Location: [facility]  YOB: 1955      Fellow:  Gender:           F             Nurse:  Admit Date:                     Sonographer:         Dilcia Guevara RD  Admission Status: Outpatient    Additional Staff:  Height:           165.10 cm     CC Report to:  Weight:           54.43 kg      Study Type:          Echocardiogram  BSA:              1.59 m2  Blood Pressure: 111 /73 mmHg    Diagnosis/ICD: I48.0-Paroxysmal atrial fibrillation  Indication:    Paroxysmal Afib  Procedure/CPT: Echo Complete w Full [id number]    Patient History:  Pertinent History: A-Fib.    Study Detail: The following Echo studies were performed: 2D, M-Mode, Doppler and  color flow.      PHYSICIAN INTERPRETATION:  Left Ventricle: The left ventricular systolic function is normal, with an estimated ejection fraction of 60-65%. There are no regional wall motion abnormalities. The left ventricular cavity size is normal. Spectral Doppler shows a normal pattern of left ventricular diastolic filling.  Left Atrium: The left atrium is moderately dilated.  Right Ventricle: The right ventricle is normal in size. There is normal right ventricular global systolic function.  Right Atrium: The right atrium is mildly dilated. The right atrium has a prominent Eustachean valve.  Aortic Valve: The aortic valve appears abnormal. There is mild to moderate aortic valve cusp calcification. There is evidence of mild aortic valve stenosis.  The aortic valve dimensionless index is 0.53. There is moderate aortic valve regurgitation. The peak instantaneous gradient of the aortic valve is 18.1 mmHg. The mean gradient of the aortic valve is 9.0 mmHg. Probably bicuspid valve with fusion of RCC and LCC.  Mitral Valve: The mitral valve is normal in structure. There is trace mitral valve regurgitation.  Tricuspid Valve: The tricuspid valve is structurally normal. There is trace to mild tricuspid regurgitation. The right ventricular systolic  pressure is unable to be estimated.  Pulmonic Valve: The pulmonic valve is structurally normal. There is trace to mild pulmonic valve regurgitation.  Pericardium: There is no pericardial effusion noted.  Aorta: The aortic root is normal. The aortic root is at the upper limits of normal size.  Systemic Veins: The inferior vena cava size appears small. There is IVC inspiratory collapse greater than 50%.  In comparison to the previous echocardiogram(s): There are no prior studies on this patient for comparison purposes.      CONCLUSIONS:  1. Left ventricular systolic function is normal with a 60-65% estimated ejection fraction.  2. The left atrium is moderately dilated.  3. Mild aortic valve stenosis.  4. Moderate aortic valve regurgitation.  5. The aortic valve appears abnormal, probably bicuspid valve with fusion of RCC and LCC.    QUANTITATIVE DATA SUMMARY:  2D MEASUREMENTS:  Normal Ranges:  Ao Root d:     3.40 cm   (2.0-3.7cm)  LAs:           3.30 cm   (2.7-4.0cm)  IVSd:          1.00 cm   (0.6-1.1cm)  LVPWd:         0.90 cm   (0.6-1.1cm)  LVIDd:         3.20 cm   (3.9-5.9cm)  LVIDs:         2.00 cm  LV Mass Index: 52.6 g/m2  LV % FS        37.5 %    LA VOLUME:  Normal Ranges:  LA Vol A4C:        66.8 ml    (22+/-6mL/m2)  LA Vol A2C:        77.4 ml  LA Vol BP:         74.7 ml  LA Vol Index A4C:  42.0ml/m2  LA Vol Index A2C:  48.6 ml/m2  LA Vol Index BP:   47.0 ml/m2  LA Area A4C:       20.3 cm2  LA Area A2C:       22.7 cm2  LA Major Axis A4C: 5.2 cm  LA Major Axis A2C: 5.7 cm  LA Volume Index:   44.0 ml/m2  LA Vol A4C:        64.0 ml  LA Vol A2C:        70.0 ml    M-MODE MEASUREMENTS:  Normal Ranges:  Ao Root: 3.50 cm (2.0-3.7cm)    AORTA MEASUREMENTS:  Normal Ranges:  Asc Ao, d: 3.70 cm (2.1-3.4cm)    LV SYSTOLIC FUNCTION BY 2D PLANIMETRY (MOD):  Normal Ranges:  EF-A4C View: 64.9 % (>=55%)  EF-A2C View: 64.3 %  EF-Biplane:  65.7 %    LV DIASTOLIC FUNCTION:  Normal Ranges:  MV Peak E:        0.64 m/s    (0.7-1.2  m/s)  MV Peak A:        0.49 m/s    (0.42-0.7 m/s)  E/A Ratio:        1.31        (1.0-2.2)  MV lateral e'     0.11 m/s  MV medial e'      0.08 m/s  MV A Dur:         137.00 msec  E/e' Ratio:       5.80        (<8.0)  PulmV Sys Aron:    39.80 cm/s  PulmV Carlos Aron:   45.50 cm/s  PulmV S/D Aron:    0.90  PulmV A Revs Aron: 21.70 cm/s  PulmV A Revs Dur: 124.00 msec    MITRAL VALVE:  Normal Ranges:  MV Vmax:    0.74 m/s (<=1.3m/s)  MV peak P.2 mmHg (<5mmHg)  MV mean P.0 mmHg (<2mmHg)  MV DT:      222 msec (150-240msec)    AORTIC VALVE:  Normal Ranges:  AoV Vmax:                2.13 m/s  (<=1.7m/s)  AoV Peak P.1 mmHg (<20mmHg)  AoV Mean P.0 mmHg  (1.7-11.5mmHg)  LVOT Max Aron:            1.04 m/s  (<=1.1m/s)  AoV VTI:                 47.30 cm  (18-25cm)  LVOT VTI:                25.30 cm  LVOT Diameter:           1.90 cm   (1.8-2.4cm)  AoV Area, VTI:           1.52 cm2  (2.5-5.5cm2)  AoV Area,Vmax:           1.38 cm2  (2.5-4.5cm2)  AoV Dimensionless Index: 0.53    AORTIC INSUFFICIENCY:  AI Vmax:       4.53 m/s  AI Half-time:  456 msec  AI Decel Rate: 315.50 cm/s2      RIGHT VENTRICLE:  RV 1   3.88 cm  RV 2   2.76 cm  RV 3   6.90 cm  TAPSE: 19.8 mm  RV s'  0.10 m/s    TRICUSPID VALVE/RVSP:  Normal Ranges:  Peak TR Velocity: 1.95 m/s  Est. RA Pressure: 3 mmHg  RV Syst Pressure: 18.2 mmHg (< 30mmHg)    PULMONIC VALVE:  Normal Ranges:  PV Max Aron: 0.6 m/s  (0.6-0.9m/s)  PV Max P.4 mmHg    Pulmonary Veins:  PulmV A Revs Dur: 124.00 msec  PulmV A Revs Aron: 21.70 cm/s  PulmV Carlos Aron:   45.50 cm/s  PulmV S/D Aron:    0.90  PulmV Sys Aron:    39.80 cm/s      [id number] Janelle Chapman MD  Electronically signed on 2023 at 11:13:43 AM         Final     Stress Testing: No results found for this or any previous visit from the past 1825 days.    Cardiac Catheterization: No results found for this or any previous visit from the past 1825 days.    Cardiac Scoring: No results found for this or any  previous visit from the past 1825 days.    AAA : No results found for this or any previous visit from the past 1825 days.    OTHER: No results found for this or any previous visit from the past 1825 days.      The 10-year ASCVD risk score (Gillian DE PAZ, et al., 2019) is: 6.3%    Values used to calculate the score:      Age: 68 years      Sex: Female      Is Non- : No      Diabetic: No      Tobacco smoker: No      Systolic Blood Pressure: 110 mmHg      Is BP treated: No      HDL Cholesterol: 90 mg/dL      Total Cholesterol: 183 mg/dL     Assessment/Plan:   1. Paroxysmal atrial fibrillation (Multi)        2. Nonrheumatic aortic valve stenosis        3. Nonrheumatic aortic valve insufficiency           Hanh Mclean 68 y.o. female with hx of BPPV follows with ENT, anxiety, migraine, parox afib on xarelto, preserved LVEF,  probably bicuspid aortic valve with moderate AI and mild aortic stenosis (6/2023) who presents today for follow up     Asymptomatic and active   EKG today unremarkable  BP and lipids well controlled   She is low risk for coloscopy    Plan  continue xarelto, may hold 48 hrs prior to colonoscopy and restart once safe from GI perspective   continue crestor  Repeat echo with one year follow up    Recommend first degree relatives for bicuspid valve       Time Spent: I spent  minutes reviewing medical testing, obtaining medical history and counselling and educating on diagnosis and documenting clinical encounter.         ____________________________________________________________  Janelle Chapman MD   of Medicine  Division of Cardiovascular Medicine   [facility]  [facility]

## 2024-06-05 LAB
ATRIAL RATE: 69 BPM
P AXIS: 48 DEGREES
P OFFSET: 197 MS
P ONSET: 138 MS
PR INTERVAL: 170 MS
Q ONSET: 223 MS
QRS COUNT: 12 BEATS
QRS DURATION: 80 MS
QT INTERVAL: 432 MS
QTC CALCULATION(BAZETT): 462 MS
QTC FREDERICIA: 452 MS
R AXIS: 47 DEGREES
T AXIS: 59 DEGREES
T OFFSET: 439 MS
VENTRICULAR RATE: 69 BPM

## 2024-06-07 ENCOUNTER — OFFICE VISIT (OUTPATIENT)
Dept: PRIMARY CARE | Facility: CLINIC | Age: 69
End: 2024-06-07
Payer: MEDICARE

## 2024-06-07 VITALS
OXYGEN SATURATION: 92 % | BODY MASS INDEX: 21.59 KG/M2 | HEART RATE: 81 BPM | WEIGHT: 129.6 LBS | HEIGHT: 65 IN | TEMPERATURE: 98.2 F

## 2024-06-07 DIAGNOSIS — H69.93 EUSTACHIAN TUBE DYSFUNCTION, BILATERAL: Primary | ICD-10-CM

## 2024-06-07 PROCEDURE — 99213 OFFICE O/P EST LOW 20 MIN: CPT | Performed by: FAMILY MEDICINE

## 2024-06-07 PROCEDURE — 1159F MED LIST DOCD IN RCRD: CPT | Performed by: FAMILY MEDICINE

## 2024-06-07 PROCEDURE — 1036F TOBACCO NON-USER: CPT | Performed by: FAMILY MEDICINE

## 2024-06-07 PROCEDURE — 1126F AMNT PAIN NOTED NONE PRSNT: CPT | Performed by: FAMILY MEDICINE

## 2024-06-07 PROCEDURE — 1157F ADVNC CARE PLAN IN RCRD: CPT | Performed by: FAMILY MEDICINE

## 2024-06-07 ASSESSMENT — ENCOUNTER SYMPTOMS
DEPRESSION: 0
LOSS OF SENSATION IN FEET: 0
OCCASIONAL FEELINGS OF UNSTEADINESS: 0

## 2024-06-07 ASSESSMENT — COLUMBIA-SUICIDE SEVERITY RATING SCALE - C-SSRS
1. IN THE PAST MONTH, HAVE YOU WISHED YOU WERE DEAD OR WISHED YOU COULD GO TO SLEEP AND NOT WAKE UP?: NO
6. HAVE YOU EVER DONE ANYTHING, STARTED TO DO ANYTHING, OR PREPARED TO DO ANYTHING TO END YOUR LIFE?: NO
2. HAVE YOU ACTUALLY HAD ANY THOUGHTS OF KILLING YOURSELF?: NO

## 2024-06-07 ASSESSMENT — PATIENT HEALTH QUESTIONNAIRE - PHQ9
SUM OF ALL RESPONSES TO PHQ9 QUESTIONS 1 AND 2: 0
1. LITTLE INTEREST OR PLEASURE IN DOING THINGS: NOT AT ALL
2. FEELING DOWN, DEPRESSED OR HOPELESS: NOT AT ALL

## 2024-06-07 ASSESSMENT — PAIN SCALES - GENERAL: PAINLEVEL: 0-NO PAIN

## 2024-06-07 NOTE — PATIENT INSTRUCTIONS
Problem List Items Addressed This Visit    None  Visit Diagnoses         Codes    Eustachian tube dysfunction, bilateral    -  Primary H69.93            Additional Visit Plans:  No findings of infection, no wax in your ear canal. Some pressure in both ears. Plan for Flonase twice a day for the next few weeks. Consider once a day Zyrtec to help dry things up (can buy this over the counter).    Follow up with ENT if no improvements.     Patient Care Team:  Olaf English DO as PCP - General (Family Medicine)    Olaf English DO   06/07/24   1:38 PM

## 2024-06-07 NOTE — PROGRESS NOTES
Outpatient Visit Note    Chief Complaint   Patient presents with    Ear Fullness       HPI:  Hanh Mclean is a 68 y.o. female here with concerns regarding her ears and possible cerumen impaction.    Denies any fever, chills, headache, drainage out of her ears, recent swimming or water submersion.  No known trauma to the ear.  Uses Q-tips; and ear plugs when she sleeps. Does not use any cleaning solutions in her ears    Is experiencing fullness of her ears. Hearing less with her hearing aides. Some sinus pressure.     Was on a decongestant for a while with a little relief. Was doing Flonase for a bit.     PHQ9/GAD7:         Past Medical History:   Diagnosis Date    Atrial fibrillation (Multi) 1980's    Migraine, unspecified, not intractable, without status migrainosus     Migraines    Personal history of other mental and behavioral disorders     History of anxiety        Current Medications  Current Outpatient Medications   Medication Instructions    amitriptyline (ELAVIL) 25 mg, oral, Nightly    cholecalciferol (Vitamin D-3) 25 MCG (1000 UT) capsule 1 capsule, oral, Daily    diphenhydrAMINE (BenadryL) 25 mg capsule 1 capsule, oral, Nightly PRN    hydrOXYzine HCL (ATARAX) 20 mg, oral, Every 24 hours    magnesium oxide (MAGOX) 400 mg, oral, Daily    NON FORMULARY 1 tablet, Daily, methofolate 1000<BR>    omega 3-dha-epa-fish oil (Fish OiL) 1,200 (144-216) mg capsule 1 capsule, oral, Daily    rivaroxaban (Xarelto) 20 mg tablet 1 tablet, oral, Daily, With food     rosuvastatin (CRESTOR) 10 mg, oral, Nightly    sertraline (ZOLOFT) 50 mg, oral, Every 24 hours    vitamin B complex (B-COMPLEX ORAL) oral, Daily, ;B-Complex SR<BR>    vits A,C,E/lutein/minerals (OCUVITE WITH LUTEIN ORAL) oral, Daily        Allergies  No Known Allergies     Past Surgical History:   Procedure Laterality Date    OTHER SURGICAL HISTORY  11/01/2021    Knee surgery    OTHER SURGICAL HISTORY  11/01/2021    Lumpectomy    OTHER SURGICAL  HISTORY  2021     section     Family History   Problem Relation Name Age of Onset    Obesity Sister Norma Bosch         Have hearing problems    Atrial fibrillation Sister Norma Bosch     Obesity Sister Jillian Jensen         Have hearing problems    No Known Problems Daughter      No Known Problems Son      Heart disease Mother Crystal Montoya     Obesity Mother Crystal Montoya     Cancer Father Wyatt Montoya      Social History     Tobacco Use    Smoking status: Former     Current packs/day: 0.00     Types: Cigarettes    Smokeless tobacco: Never   Vaping Use    Vaping status: Never Used   Substance Use Topics    Alcohol use: Yes     Alcohol/week: 10.0 standard drinks of alcohol     Types: 10 Glasses of wine per week    Drug use: Never     Tobacco Use: Medium Risk (2024)    Patient History     Smoking Tobacco Use: Former     Smokeless Tobacco Use: Never     Passive Exposure: Not on file        ROS  All pertinent positive symptoms are included in the history of present illness.  All other systems have been reviewed and are negative and noncontributory to this patient's current ailments.    VITAL SIGNS  Vitals:    24 1314   Pulse: 81   Temp: 36.8 °C (98.2 °F)   SpO2: 92%     Vitals:    24 1314   Weight: 58.8 kg (129 lb 9.6 oz)      Body mass index is 21.57 kg/m².     PHYSICAL EXAM  GENERAL APPEARANCE: well nourished, well developed, looks like stated age, in no acute distress, not ill or tired appearing, conversing well.   HEENT: no trauma, normocephalic. TMs intact and bulging with no purulence. No wax, No canal swelling  NECK: supple without rigidity, no neck mass was observed.   LUNGS: good chest wall expansion. In no acute respiratory distress.   EXTREMITIES: moving all extremities equally with no edema.   SKIN: normal skin color and pigmentation, without rash.   NEUROLOGIC EXAM: CN II-XII grossly intact, normal gait.   PSYCH: mood and affect appropriate; alert and oriented to time,  place, person; no difficulty with speech or language.     Counseling:       Medication education:           Education:  The patient is counseled regarding potential side-effects of all new medications          Understanding:  Patient expressed understanding of information conveyed today          Adherence:  No barriers to adherence identified     Assessment/Plan   Problem List Items Addressed This Visit    None  Visit Diagnoses         Codes    Eustachian tube dysfunction, bilateral    -  Primary H69.93            Additional Visit Plans:  No findings of infection, no wax in your ear canal. Some pressure in both ears. Plan for Flonase twice a day for the next few weeks. Consider once a day Zyrtec to help dry things up (can buy this over the counter). No more decongestant.     Follow up with ENT if no improvements.     Patient Care Team:  Olaf English DO as PCP - General (Family Medicine)    Olaf English DO   06/07/24   1:38 PM

## 2024-06-10 ENCOUNTER — APPOINTMENT (OUTPATIENT)
Dept: CARDIOLOGY | Facility: CLINIC | Age: 69
End: 2024-06-10
Payer: MEDICARE

## 2024-06-18 ENCOUNTER — OFFICE VISIT (OUTPATIENT)
Dept: PRIMARY CARE | Facility: CLINIC | Age: 69
End: 2024-06-18
Payer: MEDICARE

## 2024-06-18 VITALS
WEIGHT: 129.6 LBS | HEIGHT: 65 IN | SYSTOLIC BLOOD PRESSURE: 132 MMHG | TEMPERATURE: 97.9 F | HEART RATE: 75 BPM | OXYGEN SATURATION: 93 % | DIASTOLIC BLOOD PRESSURE: 82 MMHG | RESPIRATION RATE: 18 BRPM | BODY MASS INDEX: 21.59 KG/M2

## 2024-06-18 DIAGNOSIS — G44.1 VASCULAR HEADACHE, NOT ELSEWHERE CLASSIFIED: ICD-10-CM

## 2024-06-18 DIAGNOSIS — F41.9 ANXIETY: Primary | ICD-10-CM

## 2024-06-18 DIAGNOSIS — E78.2 MIXED HYPERLIPIDEMIA: ICD-10-CM

## 2024-06-18 DIAGNOSIS — K59.09 CHRONIC CONSTIPATION: ICD-10-CM

## 2024-06-18 DIAGNOSIS — R42 DIZZINESS: ICD-10-CM

## 2024-06-18 PROCEDURE — 1126F AMNT PAIN NOTED NONE PRSNT: CPT | Performed by: FAMILY MEDICINE

## 2024-06-18 PROCEDURE — 99214 OFFICE O/P EST MOD 30 MIN: CPT | Performed by: FAMILY MEDICINE

## 2024-06-18 PROCEDURE — 1159F MED LIST DOCD IN RCRD: CPT | Performed by: FAMILY MEDICINE

## 2024-06-18 PROCEDURE — 1160F RVW MEDS BY RX/DR IN RCRD: CPT | Performed by: FAMILY MEDICINE

## 2024-06-18 PROCEDURE — 1036F TOBACCO NON-USER: CPT | Performed by: FAMILY MEDICINE

## 2024-06-18 PROCEDURE — 1157F ADVNC CARE PLAN IN RCRD: CPT | Performed by: FAMILY MEDICINE

## 2024-06-18 RX ORDER — ROSUVASTATIN CALCIUM 10 MG/1
10 TABLET, COATED ORAL NIGHTLY
Qty: 90 TABLET | Refills: 1 | Status: SHIPPED | OUTPATIENT
Start: 2024-06-18

## 2024-06-18 RX ORDER — AMITRIPTYLINE HYDROCHLORIDE 25 MG/1
25 TABLET, FILM COATED ORAL NIGHTLY
Qty: 90 TABLET | Refills: 1 | Status: SHIPPED | OUTPATIENT
Start: 2024-06-18 | End: 2024-12-15

## 2024-06-18 RX ORDER — SERTRALINE HYDROCHLORIDE 50 MG/1
50 TABLET, FILM COATED ORAL EVERY 24 HOURS
Qty: 90 TABLET | Refills: 1 | Status: SHIPPED | OUTPATIENT
Start: 2024-06-18 | End: 2024-12-15

## 2024-06-18 RX ORDER — HYDROXYZINE HYDROCHLORIDE 10 MG/1
20 TABLET, FILM COATED ORAL EVERY 24 HOURS
Qty: 180 TABLET | Refills: 1 | Status: SHIPPED | OUTPATIENT
Start: 2024-06-18 | End: 2024-12-15

## 2024-06-18 ASSESSMENT — COLUMBIA-SUICIDE SEVERITY RATING SCALE - C-SSRS
6. HAVE YOU EVER DONE ANYTHING, STARTED TO DO ANYTHING, OR PREPARED TO DO ANYTHING TO END YOUR LIFE?: NO
2. HAVE YOU ACTUALLY HAD ANY THOUGHTS OF KILLING YOURSELF?: NO
1. IN THE PAST MONTH, HAVE YOU WISHED YOU WERE DEAD OR WISHED YOU COULD GO TO SLEEP AND NOT WAKE UP?: NO

## 2024-06-18 ASSESSMENT — PATIENT HEALTH QUESTIONNAIRE - PHQ9
4. FEELING TIRED OR HAVING LITTLE ENERGY: NOT AT ALL
10. IF YOU CHECKED OFF ANY PROBLEMS, HOW DIFFICULT HAVE THESE PROBLEMS MADE IT FOR YOU TO DO YOUR WORK, TAKE CARE OF THINGS AT HOME, OR GET ALONG WITH OTHER PEOPLE: NOT DIFFICULT AT ALL
8. MOVING OR SPEAKING SO SLOWLY THAT OTHER PEOPLE COULD HAVE NOTICED. OR THE OPPOSITE, BEING SO FIGETY OR RESTLESS THAT YOU HAVE BEEN MOVING AROUND A LOT MORE THAN USUAL: NOT AT ALL
7. TROUBLE CONCENTRATING ON THINGS, SUCH AS READING THE NEWSPAPER OR WATCHING TELEVISION: NOT AT ALL
2. FEELING DOWN, DEPRESSED OR HOPELESS: NOT AT ALL
1. LITTLE INTEREST OR PLEASURE IN DOING THINGS: NOT AT ALL
SUM OF ALL RESPONSES TO PHQ9 QUESTIONS 1 & 2: 0
1. LITTLE INTEREST OR PLEASURE IN DOING THINGS: NOT AT ALL
5. POOR APPETITE OR OVEREATING: NOT AT ALL
SUM OF ALL RESPONSES TO PHQ QUESTIONS 1-9: 1
9. THOUGHTS THAT YOU WOULD BE BETTER OFF DEAD, OR OF HURTING YOURSELF: NOT AT ALL
SUM OF ALL RESPONSES TO PHQ9 QUESTIONS 1 & 2: 0
3. TROUBLE FALLING OR STAYING ASLEEP: SEVERAL DAYS
2. FEELING DOWN, DEPRESSED OR HOPELESS: NOT AT ALL
6. FEELING BAD ABOUT YOURSELF - OR THAT YOU ARE A FAILURE OR HAVE LET YOURSELF OR YOUR FAMILY DOWN: NOT AT ALL

## 2024-06-18 ASSESSMENT — ANXIETY QUESTIONNAIRES
1. FEELING NERVOUS, ANXIOUS, OR ON EDGE: NOT AT ALL
6. BECOMING EASILY ANNOYED OR IRRITABLE: NOT AT ALL
4. TROUBLE RELAXING: NOT AT ALL
IF YOU CHECKED OFF ANY PROBLEMS ON THIS QUESTIONNAIRE, HOW DIFFICULT HAVE THESE PROBLEMS MADE IT FOR YOU TO DO YOUR WORK, TAKE CARE OF THINGS AT HOME, OR GET ALONG WITH OTHER PEOPLE: NOT DIFFICULT AT ALL
7. FEELING AFRAID AS IF SOMETHING AWFUL MIGHT HAPPEN: NOT AT ALL
5. BEING SO RESTLESS THAT IT IS HARD TO SIT STILL: NOT AT ALL
2. NOT BEING ABLE TO STOP OR CONTROL WORRYING: NOT AT ALL
3. WORRYING TOO MUCH ABOUT DIFFERENT THINGS: NOT AT ALL
GAD7 TOTAL SCORE: 0

## 2024-06-18 ASSESSMENT — PAIN SCALES - GENERAL: PAINLEVEL: 0-NO PAIN

## 2024-06-18 ASSESSMENT — ENCOUNTER SYMPTOMS
DEPRESSION: 0
LOSS OF SENSATION IN FEET: 0
OCCASIONAL FEELINGS OF UNSTEADINESS: 0

## 2024-06-18 NOTE — PROGRESS NOTES
Outpatient Visit Note    Chief Complaint   Patient presents with    medication check       HPI:  Hanh Mclean is a 68 y.o. female here for medicine follow-up    Headaches, dizziness and sleeping have been better with her being on amitriptyline.  She was going to pursue vestibular rehab in March.  She states that Pt is helping and she likes it. Doing flonase twice and zyrtec once daily. Balance is getting better. Has gotten off the decongestant now.     Has seen ENT.    For anxiety she was previously on Lexapro which was disrupting her sleep.  Celexa was no longer covering her.  I transitioned her to Zoloft and recommended hydroxyzine as needed.  She remains on these, states that she likes this and has enough coverage. No side effects.     For chronic constipation I felt the Zoloft would help as well as having her take magnesium oxide and increasing her fiber intake.  Plan to stay off Senna. FMuch better on MagOx. Using a squatty potty. BMs are much easier.     She is on rosuvastatin for hyperlipidemia.  Denies any myalgia or chest pain.  No concerning side effects.  Had labs done in February.     PHQ9/GAD7:  Over the past 2 weeks, how often have you been bothered by any of the following problems?  Trouble falling or staying asleep, or sleeping too much: Several days  Feeling tired or having little energy: Not at all  Poor appetite or overeating: Not at all  Feeling bad about yourself - or that you are a failure or have let yourself or your family down: Not at all  Trouble concentrating on things, such as reading the newspaper or watching television: Not at all  Moving or speaking so slowly that other people could have noticed? Or the opposite - being so fidgety or restless that you have been moving around a lot more than usual.: Not at all  Thoughts that you would be better off dead or hurting yourself in some way: Not at all  Patient Health Questionnaire-9 Score: 1  Over the last 2 weeks, how often have  you been bothered by any of the following problems?  Feeling nervous, anxious, or on edge: Not at all  Not being able to stop or control worrying: Not at all  Worrying too much about different things: Not at all  Trouble relaxing: Not at all  Being so restless that it is hard to sit still: Not at all  Becoming easily annoyed or irritable: Not at all  Feeling afraid as if something awful might happen: Not at all  AUDREY-7 Total Score: 0      Patient Active Problem List    Diagnosis Date Noted    Nonrheumatic aortic valve stenosis 06/04/2024    Nonrheumatic aortic valve insufficiency 06/04/2024    Bicuspid aortic valve ([facility]) 06/04/2024    Bilateral sensorineural hearing loss 09/14/2023    Tinnitus of both ears 09/14/2023    Vertigo 09/14/2023    Anxiety 12/28/2022    Angular cheilitis 12/28/2022    Elevated glucose level 07/28/2022    Vitamin B 12 deficiency 07/18/2022    Incontinence of urine in female 07/18/2022    Low magnesium levels 07/18/2022    Vitamin D deficiency 07/18/2022    Myalgia 05/31/2022    Hyperlipemia 05/06/2021    A-fib (Multi) 05/06/2021    Cellulitis of right arm 06/04/2020    Chest pain at rest 12/02/2019        Past Medical History:   Diagnosis Date    Anxiety Lifetime    Atrial fibrillation (Multi) 1980's    HL (hearing loss) 10 years    Migraine, unspecified, not intractable, without status migrainosus     Migraines    Personal history of other mental and behavioral disorders     History of anxiety        Current Medications  Current Outpatient Medications   Medication Instructions    amitriptyline (ELAVIL) 25 mg, oral, Nightly    cholecalciferol (Vitamin D-3) 25 MCG (1000 UT) capsule 1 capsule, oral, Daily    hydrOXYzine HCL (ATARAX) 20 mg, oral, Every 24 hours    magnesium oxide (MAGOX) 400 mg, oral, Daily    NON FORMULARY 1 tablet, Daily, methofolate 1000<BR>    omega 3-dha-epa-fish oil (Fish OiL) 1,200 (144-216) mg capsule 1 capsule, oral, Daily    rivaroxaban (Xarelto) 20 mg tablet 1  tablet, oral, Daily, With food     rosuvastatin (CRESTOR) 10 mg, oral, Nightly    sertraline (ZOLOFT) 50 mg, oral, Every 24 hours    vitamin B complex (B-COMPLEX ORAL) oral, Daily, ;B-Complex SR<BR>    vits A,C,E/lutein/minerals (OCUVITE WITH LUTEIN ORAL) oral, Daily        Allergies  No Known Allergies     Past Surgical History:   Procedure Laterality Date    BREAST SURGERY       SECTION, LOW TRANSVERSE  2, 1980s    EYE SURGERY  Cataracts 20 teens    OTHER SURGICAL HISTORY  2021    Knee surgery    OTHER SURGICAL HISTORY  2021    Lumpectomy    OTHER SURGICAL HISTORY  2021     section     Family History   Problem Relation Name Age of Onset    Heart disease Mother Crystal Montoya     Obesity Mother Crystal Montoya     Stroke Mother Crystal Montoya     Vision loss Mother Crystal Montoya     Cancer Father Wyatt Montoya     Obesity Sister Norma Bosch         Have hearing problems    Atrial fibrillation Sister Norma Bosch     Arthritis Sister Norma Bosch     Heart disease Sister Norma Bosch     Obesity Sister Jillian Jensen         Have hearing problems    No Known Problems Daughter      No Known Problems Son       Social History     Tobacco Use    Smoking status: Former     Current packs/day: 0.00     Types: Cigarettes    Smokeless tobacco: Never   Vaping Use    Vaping status: Never Used   Substance Use Topics    Alcohol use: Yes     Alcohol/week: 10.0 standard drinks of alcohol     Types: 10 Glasses of wine per week    Drug use: Never     Tobacco Use: Medium Risk (2024)    Patient History     Smoking Tobacco Use: Former     Smokeless Tobacco Use: Never     Passive Exposure: Not on file        ROS  All pertinent positive symptoms are included in the history of present illness.  All other systems have been reviewed and are negative and noncontributory to this patient's current ailments.    VITAL SIGNS  Vitals:    24 1124   BP: 132/82   Pulse: 75   Resp: 18   Temp: 36.6 °C (97.9 °F)    SpO2: 93%     Vitals:    06/18/24 1124   Weight: 58.8 kg (129 lb 9.6 oz)      Body mass index is 21.57 kg/m².     PHYSICAL EXAM  GENERAL APPEARANCE: well nourished, well developed, looks like stated age, in no acute distress, not ill or tired appearing, conversing well.   HEENT: no trauma, normocephalic.   NECK: supple without rigidity, no neck mass was observed.   LUNGS: good chest wall expansion. In no acute respiratory distress.   EXTREMITIES: moving all extremities equally with no edema.   SKIN: normal skin color and pigmentation, without rash.   NEUROLOGIC EXAM: CN II-XII grossly intact, normal gait.   PSYCH: mood and affect appropriate; alert and oriented to time, place, person; no difficulty with speech or language.     Counseling:       Medication education:           Education:  The patient is counseled regarding potential side-effects of all new medications          Understanding:  Patient expressed understanding of information conveyed today          Adherence:  No barriers to adherence identified     Assessment/Plan   Problem List Items Addressed This Visit             ICD-10-CM    Anxiety - Primary F41.9    Relevant Medications    hydrOXYzine HCL (Atarax) 10 mg tablet    sertraline (Zoloft) 50 mg tablet    Hyperlipemia E78.5    Relevant Medications    rosuvastatin (Crestor) 10 mg tablet     Other Visit Diagnoses         Codes    Dizziness     R42    Relevant Medications    amitriptyline (Elavil) 25 mg tablet    Chronic constipation     K59.09    Vascular headache, not elsewhere classified     G44.1    Relevant Medications    amitriptyline (Elavil) 25 mg tablet            Additional Visit Plans:  Doing well on your medicines, refills given. No changes at this time. Follow up in 6 months for medicine checkup    Next Wellness Exam/Annual Physical Due  At your earliest convenience    Patient Care Team:  Olaf English DO as PCP - General (Family Medicine)    Olaf English DO   06/18/24   12:09 PM

## 2024-06-18 NOTE — PATIENT INSTRUCTIONS
Problem List Items Addressed This Visit             ICD-10-CM    Anxiety - Primary F41.9    Hyperlipemia E78.5     Other Visit Diagnoses         Codes    Dizziness     R42    Chronic constipation     K59.09            Additional Visit Plans:  Doing well on your medicines, refills given. No changes at this time. Follow up in 6 months for medicine checkup    Consider Dr. Jaffe in [place] for your Daughter.     Next Wellness Exam/Annual Physical Due  At your earliest convenience    Patient Care Team:  Olaf English DO as PCP - General (Family Medicine)    Olaf English DO   06/18/24   11:46 AM

## 2024-07-01 DIAGNOSIS — I48.0 PAROXYSMAL ATRIAL FIBRILLATION (MULTI): Primary | ICD-10-CM

## 2024-07-03 DIAGNOSIS — I48.0 PAROXYSMAL ATRIAL FIBRILLATION (MULTI): ICD-10-CM

## 2024-07-25 ENCOUNTER — TELEPHONE (OUTPATIENT)
Dept: PRIMARY CARE | Facility: CLINIC | Age: 69
End: 2024-07-25
Payer: MEDICARE

## 2024-07-25 NOTE — TELEPHONE ENCOUNTER
----- Message from Olaf English sent at 7/25/2024  5:45 AM EDT -----  Repeat colonoscopy in 1 year

## 2024-08-22 ENCOUNTER — OFFICE VISIT (OUTPATIENT)
Dept: PRIMARY CARE | Facility: CLINIC | Age: 69
End: 2024-08-22
Payer: MEDICARE

## 2024-08-22 VITALS
HEART RATE: 63 BPM | SYSTOLIC BLOOD PRESSURE: 124 MMHG | BODY MASS INDEX: 21.66 KG/M2 | RESPIRATION RATE: 18 BRPM | OXYGEN SATURATION: 96 % | HEIGHT: 65 IN | DIASTOLIC BLOOD PRESSURE: 76 MMHG | WEIGHT: 130 LBS | TEMPERATURE: 98.4 F

## 2024-08-22 DIAGNOSIS — J30.9 ALLERGIC RHINITIS, UNSPECIFIED SEASONALITY, UNSPECIFIED TRIGGER: ICD-10-CM

## 2024-08-22 DIAGNOSIS — J31.0 CHRONIC RHINITIS: Primary | ICD-10-CM

## 2024-08-22 PROCEDURE — 1157F ADVNC CARE PLAN IN RCRD: CPT | Performed by: FAMILY MEDICINE

## 2024-08-22 PROCEDURE — 1126F AMNT PAIN NOTED NONE PRSNT: CPT | Performed by: FAMILY MEDICINE

## 2024-08-22 PROCEDURE — 99213 OFFICE O/P EST LOW 20 MIN: CPT | Performed by: FAMILY MEDICINE

## 2024-08-22 PROCEDURE — 3008F BODY MASS INDEX DOCD: CPT | Performed by: FAMILY MEDICINE

## 2024-08-22 PROCEDURE — 1160F RVW MEDS BY RX/DR IN RCRD: CPT | Performed by: FAMILY MEDICINE

## 2024-08-22 PROCEDURE — 1036F TOBACCO NON-USER: CPT | Performed by: FAMILY MEDICINE

## 2024-08-22 PROCEDURE — 1159F MED LIST DOCD IN RCRD: CPT | Performed by: FAMILY MEDICINE

## 2024-08-22 ASSESSMENT — ENCOUNTER SYMPTOMS
DEPRESSION: 0
LOSS OF SENSATION IN FEET: 0
OCCASIONAL FEELINGS OF UNSTEADINESS: 0

## 2024-08-22 ASSESSMENT — COLUMBIA-SUICIDE SEVERITY RATING SCALE - C-SSRS
6. HAVE YOU EVER DONE ANYTHING, STARTED TO DO ANYTHING, OR PREPARED TO DO ANYTHING TO END YOUR LIFE?: NO
1. IN THE PAST MONTH, HAVE YOU WISHED YOU WERE DEAD OR WISHED YOU COULD GO TO SLEEP AND NOT WAKE UP?: NO
2. HAVE YOU ACTUALLY HAD ANY THOUGHTS OF KILLING YOURSELF?: NO

## 2024-08-22 ASSESSMENT — PATIENT HEALTH QUESTIONNAIRE - PHQ9
2. FEELING DOWN, DEPRESSED OR HOPELESS: NOT AT ALL
SUM OF ALL RESPONSES TO PHQ9 QUESTIONS 1 & 2: 0
1. LITTLE INTEREST OR PLEASURE IN DOING THINGS: NOT AT ALL

## 2024-08-22 ASSESSMENT — PAIN SCALES - GENERAL: PAINLEVEL: 0-NO PAIN

## 2024-08-22 NOTE — PROGRESS NOTES
Outpatient Visit Note    Chief Complaint   Patient presents with    medication fu       HPI:  Hanh Mclean is a 68 y.o. female here  with some questions about her medicines.     Rhinitis has its moments when it gets worse, no previous allergy testing. When her ears are full, she gets the dizziness a little bit. But otherwise her balance is better. Doing flonase twice and zyrtec once daily.    Sleeps better with hydroxyzine on board, 20mg is perfect with no grogginess.     She is on rosuvastatin for hyperlipidemia.  Denies any myalgia or chest pain.  No concerning side effects.      She says she just wanted to talk through some of these medications.  Does not have any specific questions today.  Has enough refills.    PHQ9/GAD7:         Patient Active Problem List    Diagnosis Date Noted    Nonrheumatic aortic valve stenosis 06/04/2024    Nonrheumatic aortic valve insufficiency 06/04/2024    Bicuspid aortic valve ([facility]) 06/04/2024    Bilateral sensorineural hearing loss 09/14/2023    Tinnitus of both ears 09/14/2023    Vertigo 09/14/2023    Anxiety 12/28/2022    Angular cheilitis 12/28/2022    Elevated glucose level 07/28/2022    Vitamin B 12 deficiency 07/18/2022    Incontinence of urine in female 07/18/2022    Low magnesium levels 07/18/2022    Vitamin D deficiency 07/18/2022    Myalgia 05/31/2022    Hyperlipemia 05/06/2021    A-fib (Multi) 05/06/2021    Cellulitis of right arm 06/04/2020    Chest pain at rest 12/02/2019        Past Medical History:   Diagnosis Date    Anxiety Lifetime    Atrial fibrillation (Multi) 1980's    HL (hearing loss) 10 years    Migraine, unspecified, not intractable, without status migrainosus     Migraines    Personal history of other mental and behavioral disorders     History of anxiety        Current Medications  Current Outpatient Medications   Medication Instructions    amitriptyline (ELAVIL) 25 mg, oral, Nightly    cholecalciferol (Vitamin D-3) 25 MCG (1000 UT)  capsule 1 capsule, oral, Daily    hydrOXYzine HCL (ATARAX) 20 mg, oral, Every 24 hours    magnesium oxide (MAGOX) 400 mg, oral, Daily    NON FORMULARY 1 tablet, Daily, methofolate 1000<BR>    omega 3-dha-epa-fish oil (Fish OiL) 1,200 (144-216) mg capsule 1 capsule, oral, Daily    rivaroxaban (XARELTO) 20 mg, oral, Daily, With food.    rosuvastatin (CRESTOR) 10 mg, oral, Nightly    sertraline (ZOLOFT) 50 mg, oral, Every 24 hours    vitamin B complex (B-COMPLEX ORAL) oral, Daily, ;B-Complex SR<BR>    vits A,C,E/lutein/minerals (OCUVITE WITH LUTEIN ORAL) oral, Daily        Allergies  No Known Allergies     Past Surgical History:   Procedure Laterality Date    BREAST SURGERY       SECTION, LOW TRANSVERSE  2, 1980s    EYE SURGERY  Cataracts 20 teens    OTHER SURGICAL HISTORY  2021    Knee surgery    OTHER SURGICAL HISTORY  2021    Lumpectomy    OTHER SURGICAL HISTORY  2021     section     Family History   Problem Relation Name Age of Onset    Heart disease Mother Crystal Montoya     Obesity Mother Crystal Montoya     Stroke Mother Crystal Montoya     Vision loss Mother Crystal Montoya     Cancer Father Wyatt Montoya     Obesity Sister Norma Bosch         Have hearing problems    Atrial fibrillation Sister Norma Bosch     Arthritis Sister Norma Bosch     Heart disease Sister Norma Bosch     Obesity Sister Jillian Jensen         Have hearing problems    No Known Problems Daughter      No Known Problems Son       Social History     Tobacco Use    Smoking status: Former     Current packs/day: 0.00     Types: Cigarettes    Smokeless tobacco: Never   Vaping Use    Vaping status: Never Used   Substance Use Topics    Alcohol use: Yes     Alcohol/week: 10.0 standard drinks of alcohol     Types: 10 Glasses of wine per week    Drug use: Never     Tobacco Use: Medium Risk (2024)    Patient History     Smoking Tobacco Use: Former     Smokeless Tobacco Use: Never     Passive Exposure: Not on file         ROS  All pertinent positive symptoms are included in the history of present illness.  All other systems have been reviewed and are negative and noncontributory to this patient's current ailments.    VITAL SIGNS  Vitals:    08/22/24 1153   BP: 124/76   Pulse: 63   Resp: 18   Temp: 36.9 °C (98.4 °F)   SpO2: 96%     Vitals:    08/22/24 1153   Weight: 59 kg (130 lb)      Body mass index is 21.63 kg/m².     PHYSICAL EXAM  GENERAL APPEARANCE: well nourished, well developed, looks like stated age, in no acute distress, not ill or tired appearing, conversing well.   HEENT: no trauma, normocephalic.   NECK: supple without rigidity, no neck mass was observed.   LUNGS: good chest wall expansion. In no acute respiratory distress.   EXTREMITIES: moving all extremities equally with no edema.   SKIN: normal skin color and pigmentation, without rash.   NEUROLOGIC EXAM: CN II-XII grossly intact, normal gait.   PSYCH: mood and affect appropriate; alert and oriented to time, place, person; no difficulty with speech or language.     Counseling:       Medication education:           Education:  The patient is counseled regarding potential side-effects of all new medications          Understanding:  Patient expressed understanding of information conveyed today          Adherence:  No barriers to adherence identified     Assessment/Plan   Problem List Items Addressed This Visit    None  Visit Diagnoses         Codes    Chronic rhinitis    -  Primary J31.0    Relevant Orders    Respiratory Allergy Profile IgE    Immunoglobulin IgE    Allergic rhinitis, unspecified seasonality, unspecified trigger     J30.9    Relevant Orders    Respiratory Allergy Profile IgE    Immunoglobulin IgE            Additional Visit Plans:  Plan for allergy labs to see if we can identify any triggers for increased moments of congestion. Doing well on your other meds.       Patient Care Team:  Olaf English DO as PCP - General (Family Medicine)    Olaf LEON  DO Tameka   08/22/24   11:58 AM

## 2024-08-22 NOTE — PATIENT INSTRUCTIONS
Problem List Items Addressed This Visit    None  Visit Diagnoses         Codes    Chronic rhinitis    -  Primary J31.0    Relevant Orders    Respiratory Allergy Profile IgE    Immunoglobulin IgE    Allergic rhinitis, unspecified seasonality, unspecified trigger     J30.9    Relevant Orders    Respiratory Allergy Profile IgE    Immunoglobulin IgE            Additional Visit Plans:  Plan for allergy labs. Doing well on your other meds.       Patient Care Team:  Olaf English DO as PCP - General (Family Medicine)    Olaf English DO   08/22/24   11:58 AM

## 2024-08-29 ENCOUNTER — APPOINTMENT (OUTPATIENT)
Dept: AUDIOLOGY | Facility: CLINIC | Age: 69
End: 2024-08-29
Payer: MEDICARE

## 2024-08-29 DIAGNOSIS — H93.293 ABNORMAL AUDITORY PERCEPTION OF BOTH EARS: ICD-10-CM

## 2024-08-29 DIAGNOSIS — R42 DIZZINESS: ICD-10-CM

## 2024-08-29 DIAGNOSIS — H90.3 SENSORINEURAL HEARING LOSS (SNHL) OF BOTH EARS: Primary | ICD-10-CM

## 2024-08-29 DIAGNOSIS — H90.3 ASYMMETRICAL SENSORINEURAL HEARING LOSS: ICD-10-CM

## 2024-08-29 DIAGNOSIS — H93.13 TINNITUS OF BOTH EARS: ICD-10-CM

## 2024-08-29 PROCEDURE — 92550 TYMPANOMETRY & REFLEX THRESH: CPT | Performed by: AUDIOLOGIST

## 2024-08-29 PROCEDURE — 92557 COMPREHENSIVE HEARING TEST: CPT | Performed by: AUDIOLOGIST

## 2024-08-29 NOTE — PROGRESS NOTES
"  AUDIOLOGY ADULT AUDIOMETRIC EVALUATION    Name:  Hanh Mclean  :  1955  Age:  68 y.o.  Date of Evaluation:  2024    Reason for visit: Ms. Mclena is seen in the clinic today at the request of otolaryngology for an audiologic evaluation.     HISTORY  Patient complains of \"feeling under water often\"., congested and also complains of ongoing tinnitus and dizziness for which she has received balance therapy.  Her last audiogram was May 2024. She has hearing aids which she wears well.     EVALUATION  See scanned audiogram: “Media” > “Audiology Report”.      RESULTS  Otoscopic Evaluation:  Right Ear: clear ear canal  Left Ear: clear ear canal    Immittance Measures:  Tympanometry:  Right Ear: Type A, normal tympanic membrane mobility with normal middle ear pressure   Left Ear: Type A, normal tympanic membrane mobility with normal middle ear pressure     Acoustic Reflexes:  Ipsilateral Right Ear:  95 85 95 95   Ipsilateral Left Ear:    90 90 95 95   Contralateral Right Ear: did not evaluate  Contralateral Left Ear: did not evaluate    Distortion Product Otoacoustic Emissions (DPOAEs):  Right Ear: PASS: 1,1.5, 2    REFER: 3-8 K HZ   Left Ear:    PASS: 1, 1.5      REFER: 2-8 K HZ     Audiometry:  Test Technique and Reliability: BEHAVIOR   Standard audiometry via supra-aural headphones. Reliability is  fair to good.    Pure tone air and bone conduction audiometry:  Right Ear:   WITHIN NORMAL LIMITS TO 1 K KHZ WITH A MILD MODERATELY SEVERE SNHL AT 1500 HZ AND ABOVE STABLE FROM .  Left Ear:  WITHIN NORMAL LIMITS TO 1 K KHZ WITH A MILD MODERATELY SEVERE SNHL AT 1500 HZ AND ABOVE STABLE FROM . SLIGHT ASYMMETRY LEFT OVER RIGHT.    Speech Audiometry (Word Recognition Scores):   Right Ear:  92% GOOD  Left Ear:     96% GOOD    IMPRESSIONS  STABLE SLIGHTLY ASYMMETRIC LEFT OVER RIGHT SNHL WITH FEELING OF \" UNDERWATER, CONGESTION\", TINNITUS AND DIZZINESS.   The presence of acoustic reflexes within normal " intensity limits is consistent with normal middle ear and brainstem function, and suggests that auditory sensitivity is not significantly impaired. An elevated or absent acoustic reflex threshold is consistent with a middle ear disorder, hearing loss in the stimulated ear, and/or interruption of neural innervation of the stapedius muscle. Present DPOAEs suggest normal/near normal cochlear outer hair cell function and are consistent with no greater than a mild hearing loss at those frequencies. Absent DPOAEs are consistent with abnormal cochlear outer hair cell function and some degree of hearing loss at those frequencies.    RECOMMENDATIONS  - Follow up with otolaryngology  as scheduled.SPECIAL TESTS FOR TINNITUS AND DIZZINESS AND ASYMMETRY AS NEEDED .  - Audiologic evaluation as needed.  - Annual audiologic evaluation, sooner if an acute change is noted.  - Audiologic evaluation in conjunction with otologic care, if an acute change is noted, and/or annually.  - Continued hearing aid use.HAC TODAY AFTER AUDIOGRAM.  - Follow-up with audiology annually for routine hearing aid maintenance, sooner if questions/problems arise.  - Follow-up with medical care team as planned.    PATIENT EDUCATION  Discussed results, impressions and recommendations with the patient. Questions were addressed and the patient was encouraged to contact our office should concerns arise.    Time for this encounter: 35 MINUTES    Gabo Roth  Licensed Audiologist

## 2024-08-29 NOTE — PROGRESS NOTES
HEARING AID CHECK    RIGHT:DEVICES: (2) Phonak Audeo P90-R RICs with medium open domes; medium receivers;  length: 1; color: P5     RIGHT SN: [id number]  LEFT SN: [id number]  TRIAL PERIOD: 5/26/2021  REPAIR/L&D WARRANTIES: 7/4/2024  FIT DATE: 4/26/2021    This patient was seen for a HAC with no complaints about her hearing aids. Her audiogram today showed very stable from 2023 audiogram and slight changes put in Papa and re calculated cleaned and checked.  Otoscopy : clear au    The following programming changes were made: Cleaned and checked with gain raised 1 overall and new domes and wax filters and vacuumed.  Reminded patient to wear earplugs in loud music and noise.  Patient knows aids are not in warranty.     The patient paid $65.00 for today's visit.  Return in 6 months or sooner if needed.    APPOINTMENT TIME:

## 2024-10-02 ENCOUNTER — LAB (OUTPATIENT)
Dept: LAB | Facility: LAB | Age: 69
End: 2024-10-02
Payer: MEDICARE

## 2024-10-02 DIAGNOSIS — J31.0 CHRONIC RHINITIS: ICD-10-CM

## 2024-10-02 DIAGNOSIS — J30.9 ALLERGIC RHINITIS, UNSPECIFIED SEASONALITY, UNSPECIFIED TRIGGER: ICD-10-CM

## 2024-10-02 LAB — IGE SERPL-ACNC: 3 IU/ML (ref 0–214)

## 2024-10-02 PROCEDURE — 86003 ALLG SPEC IGE CRUDE XTRC EA: CPT

## 2024-10-02 PROCEDURE — 82785 ASSAY OF IGE: CPT

## 2024-10-02 PROCEDURE — 36415 COLL VENOUS BLD VENIPUNCTURE: CPT

## 2024-10-03 LAB

## 2024-12-31 ENCOUNTER — TELEPHONE (OUTPATIENT)
Dept: PRIMARY CARE | Facility: CLINIC | Age: 69
End: 2024-12-31
Payer: MEDICARE

## 2024-12-31 DIAGNOSIS — F41.9 ANXIETY: ICD-10-CM

## 2024-12-31 DIAGNOSIS — G44.1 VASCULAR HEADACHE, NOT ELSEWHERE CLASSIFIED: ICD-10-CM

## 2024-12-31 DIAGNOSIS — R42 DIZZINESS: ICD-10-CM

## 2024-12-31 NOTE — TELEPHONE ENCOUNTER
Refill Request:    hydrOXYzine HCL (Atarax) 10 mg tablet Take 2 tablets (20 mg) by mouth once every 24 hours.     amitriptyline (Elavil) 25 mg tablet Take 1 tablet (25 mg) by mouth once daily at bedtime.     sertraline (Zoloft) 50 mg tablet Take 1 tablet (50 mg) by mouth once every 24 hours.     # 90 day supply    Pharmacy:  [id number] - [place] [address] [address] [facility] Phone: [phone]   Fax: [phone]          Last OV-08/22/24, medication Follow up.    Next OV- 01/09/25, medication follow up.    Hanh's # [phone]

## 2025-01-06 ENCOUNTER — APPOINTMENT (OUTPATIENT)
Dept: OTOLARYNGOLOGY | Facility: CLINIC | Age: 70
End: 2025-01-06
Payer: MEDICARE

## 2025-01-06 VITALS — HEIGHT: 65 IN | WEIGHT: 137 LBS | BODY MASS INDEX: 22.82 KG/M2

## 2025-01-06 DIAGNOSIS — H93.13 BILATERAL TINNITUS: ICD-10-CM

## 2025-01-06 DIAGNOSIS — R26.89 IMBALANCE: ICD-10-CM

## 2025-01-06 DIAGNOSIS — G44.89 OTHER HEADACHE SYNDROME: ICD-10-CM

## 2025-01-06 DIAGNOSIS — H90.3 BILATERAL SENSORINEURAL HEARING LOSS: Primary | ICD-10-CM

## 2025-01-06 PROCEDURE — 3008F BODY MASS INDEX DOCD: CPT | Performed by: OTOLARYNGOLOGY

## 2025-01-06 PROCEDURE — 1159F MED LIST DOCD IN RCRD: CPT | Performed by: OTOLARYNGOLOGY

## 2025-01-06 PROCEDURE — 1157F ADVNC CARE PLAN IN RCRD: CPT | Performed by: OTOLARYNGOLOGY

## 2025-01-06 PROCEDURE — 1036F TOBACCO NON-USER: CPT | Performed by: OTOLARYNGOLOGY

## 2025-01-06 PROCEDURE — 99203 OFFICE O/P NEW LOW 30 MIN: CPT | Performed by: OTOLARYNGOLOGY

## 2025-01-06 RX ORDER — AMITRIPTYLINE HYDROCHLORIDE 25 MG/1
25 TABLET, FILM COATED ORAL NIGHTLY
Qty: 90 TABLET | Refills: 0 | Status: SHIPPED | OUTPATIENT
Start: 2025-01-06 | End: 2025-07-05

## 2025-01-06 RX ORDER — SERTRALINE HYDROCHLORIDE 50 MG/1
50 TABLET, FILM COATED ORAL EVERY 24 HOURS
Qty: 90 TABLET | Refills: 0 | Status: SHIPPED | OUTPATIENT
Start: 2025-01-06 | End: 2025-07-05

## 2025-01-06 RX ORDER — HYDROXYZINE HYDROCHLORIDE 10 MG/1
20 TABLET, FILM COATED ORAL EVERY 24 HOURS
Qty: 180 TABLET | Refills: 0 | Status: SHIPPED | OUTPATIENT
Start: 2025-01-06 | End: 2025-07-05

## 2025-01-06 NOTE — PROGRESS NOTES
Chief Complaint   Patient presents with    Dizziness     LOV: 2021 VASCULAR HEADACHES & VESTIBULAR MIGRAINES, H/O TINNITUS & VERTIGO     HPI:  Hanh Mclean is a 69 y.o. female who wears hearing aids for her history of sensorineural hearing loss and tinnitus.  No recent changes.  She also has a history of benign positional vertigo last treated several years ago.  No recent vertigo but she does have headaches, migraines, and some imbalance at times.  Normal MRI May 2020.  Has seen Dr. Polanco in the past.    PMH:  Past Medical History:   Diagnosis Date    Anxiety Lifetime    Atrial fibrillation (Multi)     HL (hearing loss) 10 years    Migraine, unspecified, not intractable, without status migrainosus     Migraines    Personal history of other mental and behavioral disorders     History of anxiety     Past Surgical History:   Procedure Laterality Date    BREAST SURGERY       SECTION, LOW TRANSVERSE  ,     EYE SURGERY  Cataracts 20 teens    OTHER SURGICAL HISTORY  2021    Knee surgery    OTHER SURGICAL HISTORY  2021    Lumpectomy    OTHER SURGICAL HISTORY  2021     section         Medications:     Current Outpatient Medications:     amitriptyline (Elavil) 25 mg tablet, Take 1 tablet (25 mg) by mouth once daily at bedtime., Disp: 90 tablet, Rfl: 0    cholecalciferol (Vitamin D-3) 25 MCG (1000 UT) capsule, Take 1 capsule (25 mcg) by mouth once daily., Disp: , Rfl:     hydrOXYzine HCL (Atarax) 10 mg tablet, Take 2 tablets (20 mg) by mouth once every 24 hours., Disp: 180 tablet, Rfl: 0    magnesium oxide (MagOx) 400 mg (241.3 mg magnesium) tablet, Take 1 tablet (400 mg) by mouth once daily., Disp: , Rfl:     NON FORMULARY, 1 each once daily. methofolate 1000, Disp: , Rfl:     omega 3-dha-epa-fish oil (Fish OiL) 1,200 (144-216) mg capsule, Take 1 capsule (1,200 mg) by mouth once daily., Disp: , Rfl:     rivaroxaban (Xarelto) 20 mg tablet, Take 1 tablet (20 mg) by mouth  "once daily. With food., Disp: 90 tablet, Rfl: 3    rosuvastatin (Crestor) 10 mg tablet, Take 1 tablet (10 mg) by mouth once daily at bedtime., Disp: 90 tablet, Rfl: 1    sertraline (Zoloft) 50 mg tablet, Take 1 tablet (50 mg) by mouth once every 24 hours., Disp: 90 tablet, Rfl: 0    vitamin B complex (B-COMPLEX ORAL), Take by mouth once daily. ;B-Complex SR, Disp: , Rfl:     vits A,C,E/lutein/minerals (OCUVITE WITH LUTEIN ORAL), Take by mouth once daily., Disp: , Rfl:      Allergies:  No Known Allergies     ROS:  Review of systems normal unless stated otherwise in the HPI and/or PMH.    Physical Exam:  Height 1.651 m (5' 5\"), weight 62.1 kg (137 lb). Body mass index is 22.8 kg/m².     GENERAL APPEARANCE: Well developed and well nourished.  Alert and oriented in no acute distress.  Normal vocal quality.      HEAD/FACE: No erythema or edema or facial tenderness.  Normal facial nerve function bilaterally.    EAR:       EXTERNAL: Normal pinnas and external auditory canals without lesion or obstructing wax.       MIDDLE EAR: Tympanic membranes intact and mobile with normal landmarks.  Middle ear space appears well aerated.       TUBE STATUS: N/A       MASTOID CAVITY: N/A       HEARING: Gross hearing assessment is within normal limits.      NOSE:       VISUALIZED USING: Anterior rhinoscopy with headlight and nasal speculum.       DORSUM: Midline, nontraumatic appearance.       MUCOSA: Normal-appearing.       SECRETIONS: Normal.       SEPTUM: Midline and nonobstructing.       INFERIOR TURBINATES: Normal.       MIDDLE TURBINATES/MEATUS: N/A       BLEEDING: N/A         ORAL CAVITY/PHARYNX:       TEETH: Adequate dentition.       TONGUE: No mass or lesion.  Normal mobility.       FLOOR OF MOUTH: No mass or lesion.       PALATE: Normal hard palate, soft palate, and uvula.       OROPHARYNX: Normal without mass or lesion.       BUCCAL MUCOSA/GBS: Normal without mass or lesion.       LIPS: " Normal.    LARYNX/HYPOPHARYNX/NASOPHARYNX: N/A    NECK: No palpable masses or abnormal adenopathy.  Trachea is midline.    THYROID: No thyromegaly or palpable nodule.    SALIVARY GLANDS: Normal bilateral parotid and submandibular glands by inspection and palpation.    TMJ's: Normal.    NEURO: Cranial nerve exam grossly normal bilaterally.       Assessment/Plan   Hanh was seen today for dizziness.  Diagnoses and all orders for this visit:  Bilateral sensorineural hearing loss (Primary)  Bilateral tinnitus  Imbalance  Other headache syndrome     Continue hearing aids.  Fortunately no vertigo but is having some imbalance and some migraine headaches.  Wonder if she may be having some vestibular component to her headaches.  She does not wish to do any further balance therapy but do recommend she see Dr. Polanco again.  She agrees.  Follow up if symptoms worsen or fail to improve.     Shola Olmstead MD

## 2025-02-11 ENCOUNTER — OFFICE VISIT (OUTPATIENT)
Dept: PRIMARY CARE | Facility: CLINIC | Age: 70
End: 2025-02-11
Payer: MEDICARE

## 2025-02-11 VITALS
RESPIRATION RATE: 16 BRPM | OXYGEN SATURATION: 98 % | BODY MASS INDEX: 21.47 KG/M2 | TEMPERATURE: 97.7 F | SYSTOLIC BLOOD PRESSURE: 140 MMHG | WEIGHT: 129 LBS | DIASTOLIC BLOOD PRESSURE: 86 MMHG | HEART RATE: 87 BPM

## 2025-02-11 DIAGNOSIS — R42 DIZZINESS: ICD-10-CM

## 2025-02-11 DIAGNOSIS — M79.605 LEFT LEG PAIN: ICD-10-CM

## 2025-02-11 DIAGNOSIS — G44.1 VASCULAR HEADACHE, NOT ELSEWHERE CLASSIFIED: ICD-10-CM
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
## 2025-02-11 DIAGNOSIS — E78.2 MIXED HYPERLIPIDEMIA: ICD-10-CM

## 2025-02-11 DIAGNOSIS — E55.9 VITAMIN D DEFICIENCY: ICD-10-CM

## 2025-02-11 DIAGNOSIS — R20.2 PARESTHESIAS: Primary | ICD-10-CM

## 2025-02-11 DIAGNOSIS — F41.9 ANXIETY: ICD-10-CM

## 2025-02-11 PROCEDURE — 1159F MED LIST DOCD IN RCRD: CPT | Performed by: FAMILY MEDICINE

## 2025-02-11 PROCEDURE — 1125F AMNT PAIN NOTED PAIN PRSNT: CPT | Performed by: FAMILY MEDICINE

## 2025-02-11 PROCEDURE — 99214 OFFICE O/P EST MOD 30 MIN: CPT | Performed by: FAMILY MEDICINE

## 2025-02-11 PROCEDURE — 1160F RVW MEDS BY RX/DR IN RCRD: CPT | Performed by: FAMILY MEDICINE

## 2025-02-11 PROCEDURE — 1157F ADVNC CARE PLAN IN RCRD: CPT | Performed by: FAMILY MEDICINE

## 2025-02-11 RX ORDER — ROSUVASTATIN CALCIUM 10 MG/1
10 TABLET, COATED ORAL NIGHTLY
Qty: 90 TABLET | Refills: 1 | Status: SHIPPED | OUTPATIENT
Start: 2025-02-11

## 2025-02-11 RX ORDER — HYDROXYZINE HYDROCHLORIDE 10 MG/1
20 TABLET, FILM COATED ORAL EVERY 24 HOURS
Qty: 180 TABLET | Refills: 1 | Status: SHIPPED | OUTPATIENT
Start: 2025-02-11 | End: 2025-08-10

## 2025-02-11 RX ORDER — SERTRALINE HYDROCHLORIDE 50 MG/1
75 TABLET, FILM COATED ORAL EVERY 24 HOURS
Qty: 135 TABLET | Refills: 1 | Status: SHIPPED | OUTPATIENT
Start: 2025-02-11 | End: 2025-08-10

## 2025-02-11 ASSESSMENT — PATIENT HEALTH QUESTIONNAIRE - PHQ9
1. LITTLE INTEREST OR PLEASURE IN DOING THINGS: NOT AT ALL
SUM OF ALL RESPONSES TO PHQ9 QUESTIONS 1 & 2: 0
2. FEELING DOWN, DEPRESSED OR HOPELESS: NOT AT ALL

## 2025-02-11 ASSESSMENT — PAIN SCALES - GENERAL: PAINLEVEL_OUTOF10: 2

## 2025-02-11 NOTE — PATIENT INSTRUCTIONS
Problem List Items Addressed This Visit             ICD-10-CM    Anxiety F41.9    Relevant Medications    sertraline (Zoloft) 50 mg tablet    hydrOXYzine HCL (Atarax) 10 mg tablet    Hyperlipemia E78.5    Relevant Medications    rosuvastatin (Crestor) 10 mg tablet    Vitamin D deficiency E55.9    Relevant Orders    Vitamin C     Other Visit Diagnoses         Codes    Paresthesias    -  Primary R20.2    Relevant Orders    Vitamin C    Dizziness     R42    Relevant Orders    Vitamin C    Vascular headache, not elsewhere classified     G44.1    Left leg pain     M79.605    Relevant Orders    XR femur left 2+ views    Fall, initial encounter     W19.XXXA    Relevant Orders    XR femur left 2+ views            Additional Visit Plans:  Plan to check all your labs and add Vit C check for any deficiencies and to guide Vit D dose selection.     Plan to trial 75mg Zoloft daily and see how you feel. Give this 3 weeks to kick in. Send me an update.     Plan for xray to make sure there is no fracture. Bruise looks ok.     Refills on your other medicines given today. Follow up in 3-6 months.      Patient Care Team:  Olaf English DO as PCP - General (Family Medicine)    Olaf English DO   02/11/25   2:30 PM

## 2025-02-11 NOTE — PROGRESS NOTES
Outpatient Visit Note    Chief Complaint   Patient presents with    Anemia    med follow up       HPI:  Hanh Mclean is a 69 y.o. female here for follow-up on her medications.    She has blood work orders from October that still need to be completed.  This includes a CBC, vitamin D, B12, BMP and TSH.    She has anxiety with some insomnia for which she is on Zoloft with hydroxyzine.  The hydroxyzine really helps her to sleep well, grogginess.  Has good support.  No suicidal or homicidal ideation.  No concerning side effects.  Asking for a higher dose to cover her mood. Gets a knocking feeling in her head that was better when she started the Zoloft. It is back a little bit.     For hyperlipidemia she is on rosuvastatin with no myalgia, chest pain or side effects.    We have talked about her having moments of rhinitis with ear pressure.  During Flonase twice a day and Zyrtec once daily.  We did allergy testing which did not show any environmental triggers or elevated IgE.  I did mention before that perhaps heartburn can make for rhinorrhea. She has noticed more rhinorrhea with cold temps. No reflux.    She is on amitriptyline for her headaches and dizziness with facial pressure, diagnosed as vascular headaches.  Completed vestibular rehab previously.  Has seen ENT and neurology in the past. She has a study showing Vit D def causing vertigo symptoms and replacement fixing this. Is taking 1000IU Vit D daily.  Was off of it for a while. Does notice improvements with the supplementation.     Slipped on ice and fell on her left hip. It still feels sore    PHQ9/GAD7:         Patient Active Problem List    Diagnosis Date Noted    Nonrheumatic aortic valve stenosis 06/04/2024    Nonrheumatic aortic valve insufficiency 06/04/2024    Bicuspid aortic valve 06/04/2024    Bilateral sensorineural hearing loss 09/14/2023    Tinnitus of both ears 09/14/2023    Vertigo 09/14/2023    Anxiety 12/28/2022    Angular cheilitis  2022    Elevated glucose level 2022    Vitamin B 12 deficiency 2022    Incontinence of urine in female 2022    Low magnesium levels 2022    Vitamin D deficiency 2022    Myalgia 2022    Hyperlipemia 2021    A-fib (Multi) 2021    Cellulitis of right arm 2020    Chest pain at rest 2019        Past Medical History:   Diagnosis Date    Anxiety Lifetime    Atrial fibrillation (Multi) 's    HL (hearing loss) 10 years    Migraine, unspecified, not intractable, without status migrainosus     Migraines    Personal history of other mental and behavioral disorders     History of anxiety        Current Medications  Current Outpatient Medications   Medication Instructions    amitriptyline (ELAVIL) 25 mg, oral, Nightly    cholecalciferol (Vitamin D-3) 25 MCG (1000 UT) capsule 1 capsule, Daily    hydrOXYzine HCL (ATARAX) 20 mg, oral, Every 24 hours    magnesium oxide (MAGOX) 400 mg, Daily    NON FORMULARY 1 tablet, Daily    omega 3-dha-epa-fish oil (Fish OiL) 1,200 (144-216) mg capsule 1 capsule, Daily    rivaroxaban (XARELTO) 20 mg, oral, Daily, With food.    rosuvastatin (CRESTOR) 10 mg, oral, Nightly    sertraline (ZOLOFT) 75 mg, oral, Every 24 hours    vits A,C,E/lutein/minerals (OCUVITE WITH LUTEIN ORAL) Daily        Allergies  No Known Allergies     Past Surgical History:   Procedure Laterality Date    BREAST SURGERY       SECTION, LOW TRANSVERSE  2, 1980s    EYE SURGERY  Cataracts 20 teens    OTHER SURGICAL HISTORY  2021    Knee surgery    OTHER SURGICAL HISTORY  2021    Lumpectomy    OTHER SURGICAL HISTORY  2021     section     Family History   Problem Relation Name Age of Onset    Heart disease Mother Crystal Montoya     Obesity Mother Crystal Montoya     Stroke Mother Crystal Montoya     Vision loss Mother Crystal Montoya     Cancer Father Wyatt Montoya     Obesity Sister Norma Bosch         Have hearing problems     Atrial fibrillation Sister Norma Bosch     Arthritis Sister Norma Bosch     Heart disease Sister Norma Bosch     Obesity Sister Jillian Jensen         Have hearing problems    No Known Problems Daughter      No Known Problems Son       Social History     Tobacco Use    Smoking status: Former     Current packs/day: 0.00     Types: Cigarettes    Smokeless tobacco: Never   Vaping Use    Vaping status: Never Used   Substance Use Topics    Alcohol use: Yes     Alcohol/week: 14.0 standard drinks of alcohol     Types: 14 Glasses of wine per week    Drug use: Never     Tobacco Use: Medium Risk (2/11/2025)    Patient History     Smoking Tobacco Use: Former     Smokeless Tobacco Use: Never     Passive Exposure: Not on file        ROS  All pertinent positive symptoms are included in the history of present illness.  All other systems have been reviewed and are negative and noncontributory to this patient's current ailments.    VITAL SIGNS  Vitals:    02/11/25 1403   BP: 140/86   Pulse: 87   Resp: 16   Temp: 36.5 °C (97.7 °F)   SpO2: 98%     Vitals:    02/11/25 1403   Weight: 58.5 kg (129 lb)      Body mass index is 21.47 kg/m².     PHYSICAL EXAM  GENERAL APPEARANCE: well nourished, well developed, looks like stated age, in no acute distress, not ill or tired appearing, conversing well.   HEENT: no trauma, normocephalic.   NECK: supple without rigidity, no neck mass was observed.   LUNGS: good chest wall expansion. In no acute respiratory distress.   EXTREMITIES: moving all extremities equally with no edema. Left lateral thigh bruise with tenderness  SKIN: normal skin color and pigmentation, without rash.   NEUROLOGIC EXAM: CN II-XII grossly intact, normal gait.   PSYCH: mood and affect appropriate; alert and oriented to time, place, person; no difficulty with speech or language.     Counseling:       Medication education:           Education:  The patient is counseled regarding potential side-effects of all new medications           Understanding:  Patient expressed understanding of information conveyed today          Adherence:  No barriers to adherence identified     Assessment/Plan   Problem List Items Addressed This Visit             ICD-10-CM    Anxiety F41.9    Relevant Medications    sertraline (Zoloft) 50 mg tablet    hydrOXYzine HCL (Atarax) 10 mg tablet    Hyperlipemia E78.5    Relevant Medications    rosuvastatin (Crestor) 10 mg tablet    Vitamin D deficiency E55.9    Relevant Orders    Vitamin C     Other Visit Diagnoses         Codes    Paresthesias    -  Primary R20.2    Relevant Orders    Vitamin C    Dizziness     R42    Relevant Orders    Vitamin C    Vascular headache, not elsewhere classified     G44.1    Left leg pain     M79.605    Relevant Orders    XR femur left 2+ views    Fall, initial encounter     W19.XXXA    Relevant Orders    XR femur left 2+ views            Additional Visit Plans:  Plan to check all your labs and add Vit C check for any deficiencies and to guide Vit D dose selection.     Plan to trial 75mg Zoloft daily and see how you feel. Give this 3 weeks to kick in. Send me an update.     Plan for xray to make sure there is no fracture. Bruise looks ok.     Refills on your other medicines given today. Follow up in 3-6 months.      Patient Care Team:  Olaf English DO as PCP - General (Family Medicine)    Olaf English DO   02/11/25   2:30 PM

## 2025-02-12 ENCOUNTER — HOSPITAL ENCOUNTER (OUTPATIENT)
Dept: RADIOLOGY | Facility: HOSPITAL | Age: 70
Discharge: HOME | End: 2025-02-12
Payer: MEDICARE

## 2025-02-12 PROCEDURE — 73552 X-RAY EXAM OF FEMUR 2/>: CPT | Mod: LT

## 2025-02-13 LAB
25(OH)D3+25(OH)D2 SERPL-MCNC: 55 NG/ML (ref 30–100)
ANION GAP SERPL CALCULATED.4IONS-SCNC: 12 MMOL/L (CALC) (ref 7–17)
BUN SERPL-MCNC: 23 MG/DL (ref 7–25)
BUN/CREAT SERPL: NORMAL (CALC) (ref 6–22)
CALCIUM SERPL-MCNC: 9.4 MG/DL (ref 8.6–10.4)
CHLORIDE SERPL-SCNC: 101 MMOL/L (ref 98–110)
CO2 SERPL-SCNC: 25 MMOL/L (ref 20–32)
CREAT SERPL-MCNC: 0.76 MG/DL (ref 0.6–1)
EGFRCR SERPLBLD CKD-EPI 2021: 84 ML/MIN/1.73M2
ERYTHROCYTE [DISTWIDTH] IN BLOOD BY AUTOMATED COUNT: 12.4 % (ref 11–15)
GLUCOSE SERPL-MCNC: 83 MG/DL (ref 65–99)
HCT VFR BLD AUTO: 42.8 % (ref 35–45)
HGB BLD-MCNC: 13.9 G/DL (ref 11.7–15.5)
MCH RBC QN AUTO: 31.5 PG (ref 27–33)
MCHC RBC AUTO-ENTMCNC: 32.5 G/DL (ref 32–36)
MCV RBC AUTO: 97.1 FL (ref 80–100)
PLATELET # BLD AUTO: 223 THOUSAND/UL (ref 140–400)
PMV BLD REES-ECKER: 11 FL (ref 7.5–12.5)
POTASSIUM SERPL-SCNC: 4.5 MMOL/L (ref 3.5–5.3)
RBC # BLD AUTO: 4.41 MILLION/UL (ref 3.8–5.1)
SODIUM SERPL-SCNC: 138 MMOL/L (ref 135–146)
TSH SERPL-ACNC: 1.21 MIU/L (ref 0.4–4.5)
WBC # BLD AUTO: 5.7 THOUSAND/UL (ref 3.8–10.8)

## 2025-02-18 ENCOUNTER — APPOINTMENT (OUTPATIENT)
Dept: PRIMARY CARE | Facility: CLINIC | Age: 70
End: 2025-02-18
Payer: MEDICARE

## 2025-02-19 LAB — VIT C SERPL-MCNC: 1.3 MG/DL (ref 0.3–2.7)

## 2025-03-27 DIAGNOSIS — G44.1 VASCULAR HEADACHE, NOT ELSEWHERE CLASSIFIED: ICD-10-CM

## 2025-03-27 DIAGNOSIS — R42 DIZZINESS: ICD-10-CM

## 2025-03-27 RX ORDER — AMITRIPTYLINE HYDROCHLORIDE 25 MG/1
25 TABLET, FILM COATED ORAL NIGHTLY
Qty: 30 TABLET | Refills: 0 | Status: SHIPPED | OUTPATIENT
Start: 2025-03-27

## 2025-05-03 ENCOUNTER — TELEMEDICINE (OUTPATIENT)
Dept: URGENT CARE | Age: 70
End: 2025-05-03
Payer: MEDICARE

## 2025-05-03 DIAGNOSIS — L98.9 SKIN LESION: Primary | ICD-10-CM

## 2025-05-03 RX ORDER — CEPHALEXIN 500 MG/1
500 CAPSULE ORAL 2 TIMES DAILY
Qty: 14 CAPSULE | Refills: 0 | Status: SHIPPED | OUTPATIENT
Start: 2025-05-03 | End: 2025-05-10

## 2025-05-03 NOTE — PROGRESS NOTES
Urgent Care Virtual Video Visit    Patient Location: [place]  Provider Location: [facility]    I have communicated my name and active licensure. Video visit completed with realtime synchronous video/audio connection. Informed consent was obtained from the patient. Patient was made aware that my evaluation and diagnosis are limited due to the fact that we are not in the same room during the interview and that this is a virtual encounter that took place via videoconferencing. Patient verbalized understanding.    HPI:    69-year-old female presents to clinic today with complaints of a skin lesion/abnormality.  She states that there is a red spot on her right chest.  She states that it is red and itchy.  There is a central spot that looks slightly scabbed.  She states has been ongoing for about 5 days.  She has noted a slight increase in the redness.  There is no noted warmth.  She used topical Benadryl cream brought some slight relief to the itching.  Denies fevers or chills.  She states that her  did have poison ivy about 10 days ago.  She has not noted any spots anywhere else.    MDM:    I was able to visualize the lesion, full examination unable to be completed due to virtual and video quality.    I discussed with patient that this may be a cellulitis versus a contact reaction.    I did start her on Keflex.  I advised her that she may use a topical steroid cream on the area as well.    Monitor for worsening or persistent signs and if this occurs please follow-up with primary care, for an in person visit for further evaluation.    Patient agreement with plan.    Patient disposition: Home    Electronically signed by Anton Hernandez PA-C  10:22 AM

## 2025-06-06 ENCOUNTER — HOSPITAL ENCOUNTER (OUTPATIENT)
Dept: CARDIOLOGY | Facility: CLINIC | Age: 70
Discharge: HOME | End: 2025-06-06
Payer: MEDICARE

## 2025-06-06 DIAGNOSIS — I35.1 NONRHEUMATIC AORTIC VALVE INSUFFICIENCY: ICD-10-CM

## 2025-06-06 DIAGNOSIS — I48.0 PAROXYSMAL ATRIAL FIBRILLATION (MULTI): ICD-10-CM

## 2025-06-06 DIAGNOSIS — I35.0 NONRHEUMATIC AORTIC VALVE STENOSIS: ICD-10-CM

## 2025-06-06 DIAGNOSIS — Q23.81 BICUSPID AORTIC VALVE: ICD-10-CM

## 2025-06-06 LAB
AORTIC VALVE MEAN GRADIENT: 13 MMHG
AORTIC VALVE PEAK VELOCITY: 2.55 M/S
AV PEAK GRADIENT: 26 MMHG
AVA (PEAK VEL): 1.38 CM2
AVA (VTI): 1.32 CM2
EJECTION FRACTION APICAL 4 CHAMBER: 72.5
EJECTION FRACTION: 68 %
LEFT ATRIUM VOLUME AREA LENGTH INDEX BSA: 33.8 ML/M2
LEFT VENTRICLE INTERNAL DIMENSION DIASTOLE: 2.71 CM (ref 3.5–6)
LEFT VENTRICULAR OUTFLOW TRACT DIAMETER: 1.9 CM
MITRAL VALVE E/A RATIO: 0.96
RIGHT VENTRICLE FREE WALL PEAK S': 13.39 CM/S
RIGHT VENTRICLE PEAK SYSTOLIC PRESSURE: 19 MMHG
TRICUSPID ANNULAR PLANE SYSTOLIC EXCURSION: 2.1 CM

## 2025-06-06 PROCEDURE — 93306 TTE W/DOPPLER COMPLETE: CPT

## 2025-06-06 PROCEDURE — 93306 TTE W/DOPPLER COMPLETE: CPT | Performed by: STUDENT IN AN ORGANIZED HEALTH CARE EDUCATION/TRAINING PROGRAM

## 2025-06-09 ENCOUNTER — APPOINTMENT (OUTPATIENT)
Dept: CARDIOLOGY | Facility: CLINIC | Age: 70
End: 2025-06-09
Payer: MEDICARE

## 2025-06-21 DIAGNOSIS — G44.1 VASCULAR HEADACHE, NOT ELSEWHERE CLASSIFIED: ICD-10-CM

## 2025-06-21 DIAGNOSIS — R42 DIZZINESS: ICD-10-CM

## 2025-06-23 ENCOUNTER — OFFICE VISIT (OUTPATIENT)
Dept: CARDIOLOGY | Facility: CLINIC | Age: 70
End: 2025-06-23
Payer: MEDICARE

## 2025-06-23 VITALS
HEART RATE: 82 BPM | OXYGEN SATURATION: 96 % | SYSTOLIC BLOOD PRESSURE: 101 MMHG | BODY MASS INDEX: 21.13 KG/M2 | DIASTOLIC BLOOD PRESSURE: 57 MMHG | WEIGHT: 127 LBS

## 2025-06-23 DIAGNOSIS — Q23.81 BICUSPID AORTIC VALVE: ICD-10-CM

## 2025-06-23 DIAGNOSIS — I35.0 NONRHEUMATIC AORTIC VALVE STENOSIS: ICD-10-CM

## 2025-06-23 DIAGNOSIS — I35.1 NONRHEUMATIC AORTIC VALVE INSUFFICIENCY: ICD-10-CM

## 2025-06-23 DIAGNOSIS — I48.0 PAROXYSMAL ATRIAL FIBRILLATION (MULTI): Primary | ICD-10-CM

## 2025-06-23 LAB
ATRIAL RATE: 76 BPM
P AXIS: 52 DEGREES
P OFFSET: 194 MS
P ONSET: 137 MS
PR INTERVAL: 172 MS
Q ONSET: 223 MS
QRS COUNT: 12 BEATS
QRS DURATION: 76 MS
QT INTERVAL: 404 MS
QTC CALCULATION(BAZETT): 454 MS
QTC FREDERICIA: 436 MS
R AXIS: 61 DEGREES
T AXIS: 69 DEGREES
T OFFSET: 425 MS
VENTRICULAR RATE: 76 BPM

## 2025-06-23 PROCEDURE — 93005 ELECTROCARDIOGRAM TRACING: CPT | Performed by: STUDENT IN AN ORGANIZED HEALTH CARE EDUCATION/TRAINING PROGRAM

## 2025-06-23 PROCEDURE — 1159F MED LIST DOCD IN RCRD: CPT | Performed by: STUDENT IN AN ORGANIZED HEALTH CARE EDUCATION/TRAINING PROGRAM

## 2025-06-23 PROCEDURE — 99212 OFFICE O/P EST SF 10 MIN: CPT

## 2025-06-23 PROCEDURE — G2211 COMPLEX E/M VISIT ADD ON: HCPCS | Performed by: STUDENT IN AN ORGANIZED HEALTH CARE EDUCATION/TRAINING PROGRAM

## 2025-06-23 PROCEDURE — 99214 OFFICE O/P EST MOD 30 MIN: CPT | Performed by: STUDENT IN AN ORGANIZED HEALTH CARE EDUCATION/TRAINING PROGRAM

## 2025-06-23 ASSESSMENT — ENCOUNTER SYMPTOMS
DEPRESSION: 0
OCCASIONAL FEELINGS OF UNSTEADINESS: 0
LOSS OF SENSATION IN FEET: 0

## 2025-06-23 NOTE — PROGRESS NOTES
Primary Care Physician: Olaf English DO   Date of Visit: 06/23/2025 10:00 AM EDT  Type of Visit: follow up       Chief Complaint:  No chief complaint on file.  Follow up on echo results     HPI  Hanh Mclean 69 y.o. female with hx of BPPV follows with ENT, anxiety, migraine, and parox afib on xarelto who presents today for follow up    She remains active  No chest pain, angina, sob, nguyen or dizziness  No syncope  She has rare afib episodes. Her heart rate goes faster after eating  Her Pulse in 80s range on her smart watch       Review of Systems   Review of Systems   12 points review of systems are negative expect for the above    Social History:  Social History     Socioeconomic History    Marital status:      Spouse name: Not on file    Number of children: Not on file    Years of education: Not on file    Highest education level: Not on file   Occupational History    Not on file   Tobacco Use    Smoking status: Former     Current packs/day: 0.00     Types: Cigarettes    Smokeless tobacco: Never   Vaping Use    Vaping status: Never Used   Substance and Sexual Activity    Alcohol use: Yes     Alcohol/week: 14.0 standard drinks of alcohol     Types: 14 Glasses of wine per week    Drug use: Never    Sexual activity: Not Currently     Partners: Male     Birth control/protection: None   Other Topics Concern    Not on file   Social History Narrative    Not on file     Social Drivers of Health     Financial Resource Strain: Not on file   Food Insecurity: Not on file   Transportation Needs: Not on file   Physical Activity: Not on file   Stress: Not on file   Social Connections: Not on file   Intimate Partner Violence: Not on file   Housing Stability: Not on file        Past Medical History:  Medical History[1]    Past Surgical History:  Surgical History[2]    Family History:  Family History[3]     Objective:       6/4/2024     1:34 PM 6/7/2024     1:14 PM 6/18/2024    11:24 AM 8/22/2024    11:53 AM 1/6/2025  "   10:32 AM 2/11/2025     2:03 PM 6/23/2025     9:59 AM   Vitals   Systolic 115  132 124  140 101   Diastolic 73  82 76  86 57   Heart Rate 72 81 75 63  87 82   Temp  36.8 °C (98.2 °F) 36.6 °C (97.9 °F) 36.9 °C (98.4 °F)  36.5 °C (97.7 °F)    Resp   18 18  16    Height  1.651 m (5' 5\") 1.651 m (5' 5\") 1.651 m (5' 5\") 1.651 m (5' 5\")     Weight (lb) 128.53 129.6 129.6 130 137 129 127   BMI 21.39 kg/m2 21.57 kg/m2 21.57 kg/m2 21.63 kg/m2 22.8 kg/m2 21.47 kg/m2 21.13 kg/m2   BSA (m2) 1.64 m2 1.64 m2 1.64 m2 1.64 m2 1.69 m2 1.64 m2 1.63 m2   Visit Report Report Report Report Report Report Report Report      Constitutional:       Appearance: Healthy appearance. Not in distress.   Neck:      Vascular:  JVD normal.   Pulmonary:      Effort: Pulmonary effort is normal.      Breath sounds: Normal breath sounds. No wheezing. No rhonchi. No rales.   Cardiovascular:      Normal rate. Regular rhythm. Normal S1. Normal S2.       Murmurs: There is 3/6 ESM.      No gallop.  N No rub.   Pulses:     Intact distal pulses.   Edema:     Peripheral edema absent.   Abdominal:      General: Bowel sounds are normal.      Palpations: Abdomen is soft.      Tenderness: There is no abdominal tenderness.   Musculoskeletal: Normal range of motion.         General: No tenderness.   Skin:     General: Skin is warm and dry.   Neurological:      General: No focal deficit present.      Mental Status: Alert and oriented to person, place and time.   Psychiatry:     Normal Mood     Allergies:  Allergies[4]    Medications:  Current Outpatient Medications   Medication Instructions    amitriptyline (ELAVIL) 25 mg, oral, Nightly    cholecalciferol (Vitamin D-3) 25 MCG (1000 UT) capsule 1 capsule, Daily    hydrOXYzine HCL (ATARAX) 20 mg, oral, Every 24 hours    magnesium oxide (MAGOX) 400 mg, Daily    NON FORMULARY 1 tablet, Daily    omega 3-dha-epa-fish oil (Fish OiL) 1,200 (144-216) mg capsule 1 capsule, Daily    rivaroxaban (XARELTO) 20 mg, oral, Daily, " With food.    rosuvastatin (CRESTOR) 10 mg, oral, Nightly    sertraline (ZOLOFT) 75 mg, oral, Every 24 hours    vits A,C,E/lutein/minerals (OCUVITE WITH LUTEIN ORAL) Daily        Labs and Imaging:     I reviewed the following labs  Lab Results   Component Value Date    WBC 5.7 02/12/2025    HGB 13.9 02/12/2025    HCT 42.8 02/12/2025     02/12/2025    CHOL 183 02/14/2024    TRIG 74 02/14/2024    HDL 90.0 02/14/2024    ALT 29 04/25/2024    AST 28 04/25/2024     02/12/2025    K 4.5 02/12/2025     02/12/2025    CREATININE 0.76 02/12/2025    BUN 23 02/12/2025    CO2 25 02/12/2025    TSH 1.21 02/12/2025    HGBA1C 5.6 05/25/2023       I reviewed the following:  EKG:   Recent Labs     06/23/25  1000 06/04/24  1320 06/14/22  1118   ATRRATE 76 69 67   VENTRATE 76 69 67   PRINT 172 170 158   QRSDUR 76 80 66   QTCFRED 436 452 444   QTCCALCB 454 462 452     Encounter Date: 06/23/25   ECG 12 lead (Clinic Performed)   Result Value    Ventricular Rate 76    Atrial Rate 76    TX Interval 172    QRS Duration 76    QT Interval 404    QTC Calculation(Bazett) 454    P Axis 52    R Axis 61    T Axis 69    QRS Count 12    Q Onset 223    P Onset 137    P Offset 194    T Offset 425    QTC Fredericia 436    Narrative    Normal sinus rhythm  Normal ECG  When compared with ECG of 04-JUN-2024 13:40,  No significant change was found     Echocardiogram:   Recent Labs     06/06/25  1103   EF 68   LVIDD 2.71   RV 19   RVFRWALLPKSP 13.39   TAPSE 2.1   Transthoracic Echo (TTE) Complete 06/06/2025    Narrative  [facility], [address] [place] [address]  Tel [phone] and Fax [phone]    TRANSTHORACIC ECHOCARDIOGRAM REPORT      Patient Name:       OLAF YOO     Reading Physician:    Neel Chapman MD  Study Date:         6/6/2025            Ordering Provider:    Neel CHAPMAN  MRN/PID:            [id number]            Fellow:  Accession#:         [id number]        Nurse:  Date of  Birth/Age:  1955 / 69 years Sonographer:          Dilcia Guevara  ROBINA  Gender assigned at  F                   Additional Staff:  Birth:  Height:             165.10 cm           Admit Date:  Weight:             55.34 kg            Admission Status:     Outpatient  BSA / BMI:          1.60 m2 / 20.30     Encounter#:           [id number]  kg/m2  Blood Pressure:     140/86 mmHg         Department Location:  [facility]    Study Type:    TRANSTHORACIC ECHO (TTE) COMPLETE  Diagnosis/ICD: Paroxysmal atrial fibrillation-I48.0; Nonrheumatic aortic (valve)  stenosis-I35.0; Nonrheumatic aortic (valve) insufficiency-I35.1  Indication:    Paroxysmal Afib; Aortic stenosis; Aortic insufficiency; Bicuspid  aortic valve  CPT Code:      Echo Complete w Full [id number]    Patient History:  Pertinent History: Chest Pain and Hyperlipidemia.    Study Detail: The following Echo studies were performed: 2D, M-Mode, Doppler and  color flow. Technically challenging study due to prominent lung  artifact.      PHYSICIAN INTERPRETATION:  Left Ventricle: The left ventricular systolic function is normal with a visually estimated ejection fraction of 65-70%. There are no regional left ventricular wall motion abnormalities. The left ventricular cavity size is normal. There is mildly increased septal and mildly increased posterior left ventricular wall thickness. There is left ventricular concentric remodeling. Spectral Doppler shows a normal pattern of left ventricular diastolic filling.  Left Atrium: The left atrium is mildly dilated.  Right Ventricle: The right ventricle is normal in size. There is normal right ventricular global systolic function.  Right Atrium: The right atrium is normal in size.  Aortic Valve: The aortic valve appears bicuspid. The aortic valve area by VTI is 1.32 cmï¿½ with a peak velocity of 2.55 m/s. The peak and mean gradients are 21 mmHg and 13 mmHg, respectively with a dimensionless index of 0.47. There is  moderate aortic valve cusp calcification. There is evidence of mild to moderate aortic valve stenosis. There is moderate aortic valve regurgitation.  Mitral Valve: The mitral valve is normal in structure. The doppler estimated peak and mean diastolic pressure gradients are 2.1 mmHg and 1 mmHg, respectively. The mean gradient of the mitral valve is 1 mmHg. There is trace mitral valve regurgitation. The E Vmax is 0.65 m/s.  Tricuspid Valve: The tricuspid valve is structurally normal. There is trace tricuspid regurgitation. The Doppler estimated right ventricular systolic pressure (RVSP) is within normal limits at 19 mmHg.  Pulmonic Valve: The pulmonic valve is not well visualized. There is physiologic pulmonic valve regurgitation.  Pericardium: There is no pericardial effusion noted.  Aorta: The aortic root is normal. There is upper limits of normal dilatation of the ascending aorta. There is no dilatation of the aortic root.  Systemic Veins: The inferior vena cava appears small in size, with IVC inspiratory collapse greater than 50%.  In comparison to the previous echocardiogram(s): Compared with study dated 6/12/2023, Mild increase in the gradients across the aortic valve otherwise no significant changes.      CONCLUSIONS:  1. The left ventricular systolic function is normal with a visually estimated ejection fraction of 65-70%.  2. There is normal right ventricular global systolic function.  3. The Doppler estimated RVSP is within normal limits at 19 mmHg.  4. The aortic valve appears bicuspid.  5. Mild to moderate aortic valve stenosis. The peak and mean gradients are 26 mmHg and 13 mmHg respectively.  6. Moderate aortic valve regurgitation.  7. The left atrium is mildly dilated.    QUANTITATIVE DATA SUMMARY:    2D MEASUREMENTS:          Normal Ranges:  IVSd:            1.03 cm  (0.6-1.1cm)  LVPWd:           1.14 cm  (0.6-1.1cm)  LVIDd:           2.71 cm  (3.9-5.9cm)  LVIDs:           1.91 cm  LV Mass Index:   50  g/m2  LVEDV Index:     34 ml/m2  LV % FS          29.5 %      LEFT ATRIUM:                  Normal Ranges:  LA Vol A4C:        50.7 ml    (22+/-6mL/m2)  LA Vol A2C:        56.7 ml  LA Vol BP:         54.2 ml  LA Vol Index A4C:  31.6ml/m2  LA Vol Index A2C:  35.4 ml/m2  LA Vol Index BP:   33.8 ml/m2  LA Area A4C:       17.1 cm2  LA Area A2C:       17.9 cm2  LA Major Axis A4C: 4.9 cm  LA Major Axis A2C: 4.8 cm  LA Volume Index:   33.8 ml/m2      M-MODE MEASUREMENTS:         Normal Ranges:  Ao Root:             3.40 cm (2.0-3.7cm)      AORTA MEASUREMENTS:         Normal Ranges:  Asc Ao, d:          3.70 cm (2.1-3.4cm)      LV SYSTOLIC FUNCTION:  Normal Ranges:  EF-A4C View:    72 % (>=55%)  EF-A2C View:    70 %  EF-Biplane:     71 %  EF-Visual:      68 %  LV EF Reported: 68 %      LV DIASTOLIC FUNCTION:             Normal Ranges:  MV Peak E:             0.65 m/s    (0.7-1.2 m/s)  MV Peak A:             0.68 m/s    (0.42-0.7 m/s)  E/A Ratio:             0.96        (1.0-2.2)  MV e'                  0.097 m/s   (>8.0)  MV lateral e'          0.12 m/s  MV medial e'           0.08 m/s  MV A Dur:              131.30 msec  E/e' Ratio:            6.74        (<8.0)  PulmV Sys Aron:         71.54 cm/s  PulmV Carlos Aron:        41.31 cm/s  PulmV S/D Aron:         1.73  PulmV A Revs Aron:      33.07 cm/s  PulmV A Revs Dur:      121.79 msec      MITRAL VALVE:          Normal Ranges:  MV Vmax:      0.72 m/s (<=1.3m/s)  MV peak P.1 mmHg (<5mmHg)  MV mean P.1 mmHg (<2mmHg)  MV DT:        263 msec (150-240msec)      AORTIC VALVE:                      Normal Ranges:  AoV Vmax:                2.55 m/s  (<=1.7m/s)  AoV Peak P.0 mmHg (<20mmHg)  AoV Mean P.9 mmHg (1.7-11.5mmHg)  LVOT Max Aron:            1.24 m/s  (<=1.1m/s)  AoV VTI:                 53.63 cm  (18-25cm)  LVOT VTI:                25.00 cm  LVOT Diameter:           1.90 cm   (1.8-2.4cm)  AoV Area, VTI:           1.32 cm2   (2.5-5.5cm2)  AoV Area,Vmax:           1.38 cm2  (2.5-4.5cm2)  AoV Dimensionless Index: 0.47      AORTIC INSUFFICIENCY:  AI Vmax:       3.76 m/s  AI Half-time:  319 msec  AI Decel Time: 1099 msec  AI Decel Rate: 342.67 cm/s2      RIGHT VENTRICLE:  RV Basal 3.60 cm  RV Mid   3.00 cm  RV Major 5.9 cm  TAPSE:   21.3 mm  RV s'    0.13 m/s      TRICUSPID VALVE/RVSP:          Normal Ranges:  Peak TR Velocity:     2.00 m/s  Est. RA Pressure:     3  RV Syst Pressure:     19       (< 30mmHg)  IVC Diam:             1.17 cm      PULMONIC VALVE:          Normal Ranges:  PV Accel Time:  101 msec (>120ms)  PV Max Aron:     0.7 m/s  (0.6-0.9m/s)  PV Max P.0 mmHg      PULMONARY VEINS:  PulmV A Revs Dur: 121.79 msec  PulmV A Revs Aron: 33.07 cm/s  PulmV Carlos Aron:   41.31 cm/s  PulmV S/D Aron:    1.73  PulmV Sys Aron:    71.54 cm/s      AORTA:  Asc Ao Diam 3.72 cm      [id number] Janelle Chapman MD  Electronically signed on 2025 at 11:40:36 AM        ** Final **    Coronary Angiography: No results found for this or any previous visit from the past 1800 days.    Right Heart Cath: No results found for this or any previous visit from the past 1800 days.    Cardiac Scoring: No results found for this or any previous visit from the past 1800 days.    Cardiac MRI: No results found for this or any previous visit from the past 1800 days.    Nuclear:No results found for this or any previous visit from the past 1800 days.    Metabolic Stress: No results found for this or any previous visit from the past 1800 days.        The 10-year ASCVD risk score (Gillian DE PAZ, et al., 2019) is: 6.1%    Values used to calculate the score:      Age: 69 years      Sex: Female      Is Non- : No      Diabetic: No      Tobacco smoker: No      Systolic Blood Pressure: 101 mmHg      Is BP treated: No      HDL Cholesterol: 90 mg/dL      Total Cholesterol: 183 mg/dL     Assessment/Plan:   1. Paroxysmal atrial fibrillation (Multi)  ECG 12 lead  (Clinic Performed)      2. Bicuspid aortic valve        3. Nonrheumatic aortic valve stenosis        4. Nonrheumatic aortic valve insufficiency           Hanh Mclean 69 y.o. female with hx of BPPV follows with ENT, anxiety, migraine, parox afib on xarelto, and bicuspid aortic valve stenosis with regurgitation    Asymptomatic and active    Rare Afib symptoms    EKG today is normal   BP and lipids well controlled    Repeated echo as above, mild to moderate aortic stenosis with moderate aortic regurgitation     Plan   continue xarelto  continue crestor   Repeat echo after 2 years      Recommend first degree relatives for bicuspid valve         # Cardiovascular Health Maintenance  - Physical Activity: Encourage 10-15K steps per day  - Healthy Diet: Avoid high fat and high sodium foods (processed meats / fast foods). Consider a mediterranean or DASH diet, emphasizing vegetables, fruits, whole grains, lean proteins  - Avoidance of smoking and smoke exposure    Time Spent: I spent 30 minutes reviewing medical testing, obtaining medical history and counselling and educating on diagnosis and documenting clinical encounter.     ____________________________________________________________  Janelle Chapman MD   of Medicine  Senior Attending Physician   Division of Cardiovascular Medicine   [facility] [place]  [facility]         [1]   Past Medical History:  Diagnosis Date    Anxiety Lifetime    Atrial fibrillation (Multi)     HL (hearing loss) 10 years    Migraine, unspecified, not intractable, without status migrainosus     Migraines    Personal history of other mental and behavioral disorders     History of anxiety   [2]   Past Surgical History:  Procedure Laterality Date    BREAST SURGERY       SECTION, LOW TRANSVERSE      EYE SURGERY  Cataracts 18 teens    OTHER SURGICAL HISTORY  2021    Knee surgery    OTHER SURGICAL HISTORY  2021     Lumpectomy    OTHER SURGICAL HISTORY  2021     section   [3]   Family History  Problem Relation Name Age of Onset    Heart disease Mother Akhildayna Lopezantwon     Obesity Mother Akhildayna Lopezantwon     Stroke Mother Alberta Johnantwon     Vision loss Mother Crystal Montoya     Cancer Father Wyatt Lopezantwon     Obesity Sister Norma Bosch         Have hearing problems    Atrial fibrillation Sister Norma Bosch     Arthritis Sister Norma Bosch     Heart disease Sister Norma Bosch     Obesity Sister Jlilian Jensen         Have hearing problems    No Known Problems Daughter      No Known Problems Son     [4] No Known Allergies

## 2025-06-24 RX ORDER — AMITRIPTYLINE HYDROCHLORIDE 25 MG/1
25 TABLET, FILM COATED ORAL NIGHTLY
Qty: 30 TABLET | Refills: 0 | Status: SHIPPED | OUTPATIENT
Start: 2025-06-24

## 2025-06-25 DIAGNOSIS — I48.0 PAROXYSMAL ATRIAL FIBRILLATION (MULTI): ICD-10-CM

## 2025-10-28 ENCOUNTER — APPOINTMENT (OUTPATIENT)
Dept: AUDIOLOGY | Facility: CLINIC | Age: 70
End: 2025-10-28
Payer: MEDICARE